# Patient Record
Sex: MALE | Race: WHITE | Employment: OTHER | URBAN - METROPOLITAN AREA
[De-identification: names, ages, dates, MRNs, and addresses within clinical notes are randomized per-mention and may not be internally consistent; named-entity substitution may affect disease eponyms.]

---

## 2021-01-01 ENCOUNTER — APPOINTMENT (OUTPATIENT)
Dept: CT IMAGING | Age: 86
DRG: 392 | End: 2021-01-01
Payer: MEDICARE

## 2021-01-01 ENCOUNTER — APPOINTMENT (OUTPATIENT)
Dept: CT IMAGING | Age: 86
DRG: 394 | End: 2021-01-01
Payer: MEDICARE

## 2021-01-01 ENCOUNTER — HOSPITAL ENCOUNTER (INPATIENT)
Age: 86
LOS: 1 days | DRG: 394 | End: 2021-03-22
Attending: EMERGENCY MEDICINE | Admitting: INTERNAL MEDICINE
Payer: MEDICARE

## 2021-01-01 ENCOUNTER — APPOINTMENT (OUTPATIENT)
Dept: GENERAL RADIOLOGY | Age: 86
DRG: 394 | End: 2021-01-01
Payer: MEDICARE

## 2021-01-01 ENCOUNTER — HOSPITAL ENCOUNTER (INPATIENT)
Age: 86
LOS: 4 days | Discharge: NURSING, REHAB OR LONG-TERM CARE | DRG: 392 | End: 2021-02-11
Attending: EMERGENCY MEDICINE | Admitting: INTERNAL MEDICINE
Payer: MEDICARE

## 2021-01-01 ENCOUNTER — APPOINTMENT (OUTPATIENT)
Dept: GENERAL RADIOLOGY | Age: 86
DRG: 392 | End: 2021-01-01
Payer: MEDICARE

## 2021-01-01 VITALS
DIASTOLIC BLOOD PRESSURE: 71 MMHG | HEIGHT: 69 IN | SYSTOLIC BLOOD PRESSURE: 115 MMHG | RESPIRATION RATE: 20 BRPM | WEIGHT: 200.4 LBS | HEART RATE: 98 BPM | OXYGEN SATURATION: 94 % | TEMPERATURE: 97 F | BODY MASS INDEX: 29.68 KG/M2

## 2021-01-01 VITALS
RESPIRATION RATE: 18 BRPM | DIASTOLIC BLOOD PRESSURE: 77 MMHG | TEMPERATURE: 96.3 F | SYSTOLIC BLOOD PRESSURE: 152 MMHG | WEIGHT: 214 LBS | HEIGHT: 69 IN | BODY MASS INDEX: 31.7 KG/M2 | HEART RATE: 90 BPM | OXYGEN SATURATION: 96 %

## 2021-01-01 DIAGNOSIS — N17.9 SEPSIS WITH ACUTE RENAL FAILURE WITHOUT SEPTIC SHOCK, DUE TO UNSPECIFIED ORGANISM, UNSPECIFIED ACUTE RENAL FAILURE TYPE (HCC): ICD-10-CM

## 2021-01-01 DIAGNOSIS — K56.609 INTESTINAL OBSTRUCTION, UNSPECIFIED CAUSE, UNSPECIFIED WHETHER PARTIAL OR COMPLETE (HCC): ICD-10-CM

## 2021-01-01 DIAGNOSIS — K57.32 DIVERTICULITIS OF COLON: Primary | ICD-10-CM

## 2021-01-01 DIAGNOSIS — N17.9 AKI (ACUTE KIDNEY INJURY) (HCC): ICD-10-CM

## 2021-01-01 DIAGNOSIS — K57.20 DIVERTICULITIS OF LARGE INTESTINE WITH ABSCESS, UNSPECIFIED BLEEDING STATUS: ICD-10-CM

## 2021-01-01 DIAGNOSIS — A41.9 SEPSIS WITH ACUTE RENAL FAILURE WITHOUT SEPTIC SHOCK, DUE TO UNSPECIFIED ORGANISM, UNSPECIFIED ACUTE RENAL FAILURE TYPE (HCC): ICD-10-CM

## 2021-01-01 DIAGNOSIS — R65.20 SEVERE SEPSIS (HCC): Primary | ICD-10-CM

## 2021-01-01 DIAGNOSIS — R65.20 SEPSIS WITH ACUTE RENAL FAILURE WITHOUT SEPTIC SHOCK, DUE TO UNSPECIFIED ORGANISM, UNSPECIFIED ACUTE RENAL FAILURE TYPE (HCC): ICD-10-CM

## 2021-01-01 DIAGNOSIS — A41.9 SEVERE SEPSIS (HCC): Primary | ICD-10-CM

## 2021-01-01 LAB
A/G RATIO: 0.7 (ref 1.1–2.2)
A/G RATIO: 0.7 (ref 1.1–2.2)
A/G RATIO: 0.8 (ref 1.1–2.2)
ALBUMIN SERPL-MCNC: 2.6 G/DL (ref 3.4–5)
ALBUMIN SERPL-MCNC: 2.9 G/DL (ref 3.4–5)
ALBUMIN SERPL-MCNC: 3.3 G/DL (ref 3.4–5)
ALBUMIN SERPL-MCNC: 3.3 G/DL (ref 3.4–5)
ALP BLD-CCNC: 68 U/L (ref 40–129)
ALP BLD-CCNC: 70 U/L (ref 40–129)
ALP BLD-CCNC: 79 U/L (ref 40–129)
ALP BLD-CCNC: 81 U/L (ref 40–129)
ALT SERPL-CCNC: 11 U/L (ref 10–40)
ALT SERPL-CCNC: 12 U/L (ref 10–40)
ALT SERPL-CCNC: 8 U/L (ref 10–40)
ALT SERPL-CCNC: 9 U/L (ref 10–40)
AMORPHOUS: ABNORMAL /HPF
ANION GAP SERPL CALCULATED.3IONS-SCNC: 10 MMOL/L (ref 3–16)
ANION GAP SERPL CALCULATED.3IONS-SCNC: 12 MMOL/L (ref 3–16)
ANION GAP SERPL CALCULATED.3IONS-SCNC: 13 MMOL/L (ref 3–16)
ANION GAP SERPL CALCULATED.3IONS-SCNC: 6 MMOL/L (ref 3–16)
ANION GAP SERPL CALCULATED.3IONS-SCNC: 7 MMOL/L (ref 3–16)
ANION GAP SERPL CALCULATED.3IONS-SCNC: 8 MMOL/L (ref 3–16)
ANION GAP SERPL CALCULATED.3IONS-SCNC: 8 MMOL/L (ref 3–16)
ANISOCYTOSIS: ABNORMAL
ANISOCYTOSIS: ABNORMAL
AST SERPL-CCNC: 12 U/L (ref 15–37)
AST SERPL-CCNC: 13 U/L (ref 15–37)
AST SERPL-CCNC: 17 U/L (ref 15–37)
AST SERPL-CCNC: 18 U/L (ref 15–37)
ATYPICAL LYMPHOCYTE RELATIVE PERCENT: 8 % (ref 0–6)
BACTERIA: ABNORMAL /HPF
BACTERIA: ABNORMAL /HPF
BANDED NEUTROPHILS RELATIVE PERCENT: 15 % (ref 0–7)
BANDED NEUTROPHILS RELATIVE PERCENT: 23 % (ref 0–7)
BANDED NEUTROPHILS RELATIVE PERCENT: 3 % (ref 0–7)
BASOPHILS ABSOLUTE: 0 K/UL (ref 0–0.2)
BASOPHILS RELATIVE PERCENT: 0 %
BASOPHILS RELATIVE PERCENT: 0.1 %
BILIRUB SERPL-MCNC: 0.4 MG/DL (ref 0–1)
BILIRUB SERPL-MCNC: 0.4 MG/DL (ref 0–1)
BILIRUB SERPL-MCNC: 0.6 MG/DL (ref 0–1)
BILIRUB SERPL-MCNC: <0.2 MG/DL (ref 0–1)
BILIRUBIN DIRECT: <0.2 MG/DL (ref 0–0.3)
BILIRUBIN URINE: NEGATIVE
BILIRUBIN, INDIRECT: ABNORMAL MG/DL (ref 0–1)
BLOOD CULTURE, ROUTINE: NORMAL
BLOOD, URINE: ABNORMAL
BUN BLDV-MCNC: 17 MG/DL (ref 7–20)
BUN BLDV-MCNC: 24 MG/DL (ref 7–20)
BUN BLDV-MCNC: 41 MG/DL (ref 7–20)
BUN BLDV-MCNC: 41 MG/DL (ref 7–20)
BUN BLDV-MCNC: 61 MG/DL (ref 7–20)
BUN BLDV-MCNC: 63 MG/DL (ref 7–20)
BUN BLDV-MCNC: 69 MG/DL (ref 7–20)
C DIFF TOXIN/ANTIGEN: NORMAL
CALCIUM SERPL-MCNC: 10.5 MG/DL (ref 8.3–10.6)
CALCIUM SERPL-MCNC: 10.6 MG/DL (ref 8.3–10.6)
CALCIUM SERPL-MCNC: 9.3 MG/DL (ref 8.3–10.6)
CALCIUM SERPL-MCNC: 9.4 MG/DL (ref 8.3–10.6)
CALCIUM SERPL-MCNC: 9.4 MG/DL (ref 8.3–10.6)
CALCIUM SERPL-MCNC: 9.6 MG/DL (ref 8.3–10.6)
CALCIUM SERPL-MCNC: 9.8 MG/DL (ref 8.3–10.6)
CALPROTECTIN, FECAL: 524 UG/G
CHLORIDE BLD-SCNC: 102 MMOL/L (ref 99–110)
CHLORIDE BLD-SCNC: 108 MMOL/L (ref 99–110)
CHLORIDE BLD-SCNC: 109 MMOL/L (ref 99–110)
CHLORIDE BLD-SCNC: 109 MMOL/L (ref 99–110)
CHLORIDE BLD-SCNC: 93 MMOL/L (ref 99–110)
CHLORIDE BLD-SCNC: 97 MMOL/L (ref 99–110)
CHLORIDE BLD-SCNC: 99 MMOL/L (ref 99–110)
CLARITY: CLEAR
CO2: 23 MMOL/L (ref 21–32)
CO2: 24 MMOL/L (ref 21–32)
CO2: 25 MMOL/L (ref 21–32)
CO2: 26 MMOL/L (ref 21–32)
CO2: 27 MMOL/L (ref 21–32)
CO2: 27 MMOL/L (ref 21–32)
CO2: 30 MMOL/L (ref 21–32)
COLOR: YELLOW
CREAT SERPL-MCNC: 0.8 MG/DL (ref 0.8–1.3)
CREAT SERPL-MCNC: 0.9 MG/DL (ref 0.8–1.3)
CREAT SERPL-MCNC: 0.9 MG/DL (ref 0.8–1.3)
CREAT SERPL-MCNC: 1.4 MG/DL (ref 0.8–1.3)
CREAT SERPL-MCNC: 1.5 MG/DL (ref 0.8–1.3)
CREAT SERPL-MCNC: 1.7 MG/DL (ref 0.8–1.3)
CREAT SERPL-MCNC: 1.7 MG/DL (ref 0.8–1.3)
CRYPTOSPORIDIUM ANTIGEN STOOL: NORMAL
CRYSTALS, UA: ABNORMAL /HPF
CULTURE, BLOOD 2: NORMAL
E HISTOLYTICA ANTIGEN STOOL: NORMAL
EKG ATRIAL RATE: 108 BPM
EKG ATRIAL RATE: 114 BPM
EKG ATRIAL RATE: 95 BPM
EKG DIAGNOSIS: NORMAL
EKG P AXIS: 55 DEGREES
EKG P AXIS: 59 DEGREES
EKG P AXIS: 72 DEGREES
EKG P-R INTERVAL: 148 MS
EKG P-R INTERVAL: 150 MS
EKG P-R INTERVAL: 164 MS
EKG Q-T INTERVAL: 304 MS
EKG Q-T INTERVAL: 326 MS
EKG Q-T INTERVAL: 354 MS
EKG QRS DURATION: 106 MS
EKG QRS DURATION: 110 MS
EKG QRS DURATION: 98 MS
EKG QTC CALCULATION (BAZETT): 407 MS
EKG QTC CALCULATION (BAZETT): 444 MS
EKG QTC CALCULATION (BAZETT): 449 MS
EKG R AXIS: -62 DEGREES
EKG R AXIS: -62 DEGREES
EKG R AXIS: -67 DEGREES
EKG T AXIS: 82 DEGREES
EKG T AXIS: 85 DEGREES
EKG T AXIS: 87 DEGREES
EKG VENTRICULAR RATE: 108 BPM
EKG VENTRICULAR RATE: 114 BPM
EKG VENTRICULAR RATE: 95 BPM
EOSINOPHILS ABSOLUTE: 0 K/UL (ref 0–0.6)
EOSINOPHILS ABSOLUTE: 0.3 K/UL (ref 0–0.6)
EOSINOPHILS RELATIVE PERCENT: 0 %
EOSINOPHILS RELATIVE PERCENT: 3 %
ESTIMATED AVERAGE GLUCOSE: 114 MG/DL
GFR AFRICAN AMERICAN: 46
GFR AFRICAN AMERICAN: 46
GFR AFRICAN AMERICAN: 53
GFR AFRICAN AMERICAN: 57
GFR AFRICAN AMERICAN: >60
GFR NON-AFRICAN AMERICAN: 38
GFR NON-AFRICAN AMERICAN: 38
GFR NON-AFRICAN AMERICAN: 44
GFR NON-AFRICAN AMERICAN: 47
GFR NON-AFRICAN AMERICAN: >60
GI BACTERIAL PATHOGENS BY PCR: NORMAL
GIARDIA ANTIGEN STOOL: NORMAL
GLOBULIN: 4 G/DL
GLOBULIN: 4.3 G/DL
GLOBULIN: 4.7 G/DL
GLUCOSE BLD-MCNC: 101 MG/DL (ref 70–99)
GLUCOSE BLD-MCNC: 105 MG/DL (ref 70–99)
GLUCOSE BLD-MCNC: 109 MG/DL (ref 70–99)
GLUCOSE BLD-MCNC: 120 MG/DL (ref 70–99)
GLUCOSE BLD-MCNC: 124 MG/DL (ref 70–99)
GLUCOSE BLD-MCNC: 128 MG/DL (ref 70–99)
GLUCOSE BLD-MCNC: 139 MG/DL (ref 70–99)
GLUCOSE BLD-MCNC: 142 MG/DL (ref 70–99)
GLUCOSE BLD-MCNC: 143 MG/DL (ref 70–99)
GLUCOSE BLD-MCNC: 144 MG/DL (ref 70–99)
GLUCOSE BLD-MCNC: 145 MG/DL (ref 70–99)
GLUCOSE BLD-MCNC: 154 MG/DL (ref 70–99)
GLUCOSE BLD-MCNC: 166 MG/DL (ref 70–99)
GLUCOSE BLD-MCNC: 170 MG/DL (ref 70–99)
GLUCOSE BLD-MCNC: 189 MG/DL (ref 70–99)
GLUCOSE BLD-MCNC: 94 MG/DL (ref 70–99)
GLUCOSE BLD-MCNC: 95 MG/DL (ref 70–99)
GLUCOSE BLD-MCNC: 96 MG/DL (ref 70–99)
GLUCOSE BLD-MCNC: 99 MG/DL (ref 70–99)
GLUCOSE URINE: NEGATIVE MG/DL
HBA1C MFR BLD: 5.6 %
HCT VFR BLD CALC: 35.3 % (ref 40.5–52.5)
HCT VFR BLD CALC: 35.5 % (ref 40.5–52.5)
HCT VFR BLD CALC: 38.3 % (ref 40.5–52.5)
HCT VFR BLD CALC: 39 % (ref 40.5–52.5)
HEMATOLOGY PATH CONSULT: NO
HEMATOLOGY PATH CONSULT: NORMAL
HEMATOLOGY PATH CONSULT: YES
HEMOGLOBIN: 11.4 G/DL (ref 13.5–17.5)
HEMOGLOBIN: 11.5 G/DL (ref 13.5–17.5)
HEMOGLOBIN: 12.4 G/DL (ref 13.5–17.5)
HEMOGLOBIN: 13.1 G/DL (ref 13.5–17.5)
HYALINE CASTS: ABNORMAL /LPF (ref 0–2)
HYALINE CASTS: ABNORMAL /LPF (ref 0–2)
KETONES, URINE: 15 MG/DL
KETONES, URINE: ABNORMAL MG/DL
KETONES, URINE: NEGATIVE MG/DL
LACTIC ACID, SEPSIS: 3.5 MMOL/L (ref 0.4–1.9)
LACTIC ACID, SEPSIS: 4.4 MMOL/L (ref 0.4–1.9)
LACTIC ACID: 1.5 MMOL/L (ref 0.4–2)
LACTIC ACID: 2.4 MMOL/L (ref 0.4–2)
LACTIC ACID: 3 MMOL/L (ref 0.4–2)
LEUKOCYTE ESTERASE, URINE: ABNORMAL
LEUKOCYTE ESTERASE, URINE: NEGATIVE
LEUKOCYTE ESTERASE, URINE: NEGATIVE
LIPASE: 11 U/L (ref 13–60)
LIPASE: 8 U/L (ref 13–60)
LYMPHOCYTES ABSOLUTE: 0.6 K/UL (ref 1–5.1)
LYMPHOCYTES ABSOLUTE: 1 K/UL (ref 1–5.1)
LYMPHOCYTES ABSOLUTE: 2 K/UL (ref 1–5.1)
LYMPHOCYTES ABSOLUTE: 2.4 K/UL (ref 1–5.1)
LYMPHOCYTES RELATIVE PERCENT: 10 %
LYMPHOCYTES RELATIVE PERCENT: 25 %
LYMPHOCYTES RELATIVE PERCENT: 5.9 %
LYMPHOCYTES RELATIVE PERCENT: 9 %
MAGNESIUM: 2.8 MG/DL (ref 1.8–2.4)
MCH RBC QN AUTO: 28.4 PG (ref 26–34)
MCH RBC QN AUTO: 28.4 PG (ref 26–34)
MCH RBC QN AUTO: 28.5 PG (ref 26–34)
MCH RBC QN AUTO: 28.8 PG (ref 26–34)
MCHC RBC AUTO-ENTMCNC: 32 G/DL (ref 31–36)
MCHC RBC AUTO-ENTMCNC: 32.5 G/DL (ref 31–36)
MCHC RBC AUTO-ENTMCNC: 32.6 G/DL (ref 31–36)
MCHC RBC AUTO-ENTMCNC: 33.7 G/DL (ref 31–36)
MCV RBC AUTO: 85.6 FL (ref 80–100)
MCV RBC AUTO: 87.1 FL (ref 80–100)
MCV RBC AUTO: 87.6 FL (ref 80–100)
MCV RBC AUTO: 88.5 FL (ref 80–100)
MICROSCOPIC EXAMINATION: YES
MONOCYTES ABSOLUTE: 0.2 K/UL (ref 0–1.3)
MONOCYTES ABSOLUTE: 1.2 K/UL (ref 0–1.3)
MONOCYTES ABSOLUTE: 1.3 K/UL (ref 0–1.3)
MONOCYTES ABSOLUTE: 1.9 K/UL (ref 0–1.3)
MONOCYTES RELATIVE PERCENT: 11 %
MONOCYTES RELATIVE PERCENT: 13.3 %
MONOCYTES RELATIVE PERCENT: 17 %
MONOCYTES RELATIVE PERCENT: 2 %
MUCUS: ABNORMAL /LPF
MUCUS: ABNORMAL /LPF
NEUTROPHILS ABSOLUTE: 6.9 K/UL (ref 1.7–7.7)
NEUTROPHILS ABSOLUTE: 7.7 K/UL (ref 1.7–7.7)
NEUTROPHILS ABSOLUTE: 7.9 K/UL (ref 1.7–7.7)
NEUTROPHILS ABSOLUTE: 8.4 K/UL (ref 1.7–7.7)
NEUTROPHILS RELATIVE PERCENT: 50 %
NEUTROPHILS RELATIVE PERCENT: 59 %
NEUTROPHILS RELATIVE PERCENT: 65 %
NEUTROPHILS RELATIVE PERCENT: 80.7 %
NITRITE, URINE: NEGATIVE
OVALOCYTES: ABNORMAL
PDW BLD-RTO: 15.1 % (ref 12.4–15.4)
PDW BLD-RTO: 15.5 % (ref 12.4–15.4)
PDW BLD-RTO: 15.6 % (ref 12.4–15.4)
PDW BLD-RTO: 15.6 % (ref 12.4–15.4)
PERFORMED ON: ABNORMAL
PERFORMED ON: NORMAL
PH UA: 5 (ref 5–8)
PH UA: 5.5 (ref 5–8)
PH UA: 5.5 (ref 5–8)
PLATELET # BLD: 248 K/UL (ref 135–450)
PLATELET # BLD: 311 K/UL (ref 135–450)
PLATELET # BLD: 367 K/UL (ref 135–450)
PLATELET # BLD: 389 K/UL (ref 135–450)
PLATELET SLIDE REVIEW: ADEQUATE
PMV BLD AUTO: 7.2 FL (ref 5–10.5)
PMV BLD AUTO: 7.2 FL (ref 5–10.5)
PMV BLD AUTO: 7.4 FL (ref 5–10.5)
PMV BLD AUTO: 7.7 FL (ref 5–10.5)
POIKILOCYTES: ABNORMAL
POLYCHROMASIA: ABNORMAL
POLYCHROMASIA: ABNORMAL
POTASSIUM REFLEX MAGNESIUM: 4.5 MMOL/L (ref 3.5–5.1)
POTASSIUM REFLEX MAGNESIUM: 4.5 MMOL/L (ref 3.5–5.1)
POTASSIUM SERPL-SCNC: 3.3 MMOL/L (ref 3.5–5.1)
POTASSIUM SERPL-SCNC: 3.4 MMOL/L (ref 3.5–5.1)
POTASSIUM SERPL-SCNC: 3.5 MMOL/L (ref 3.5–5.1)
POTASSIUM SERPL-SCNC: 4.3 MMOL/L (ref 3.5–5.1)
POTASSIUM SERPL-SCNC: 4.3 MMOL/L (ref 3.5–5.1)
PROTEIN UA: ABNORMAL MG/DL
PROTEIN UA: NEGATIVE MG/DL
PROTEIN UA: NEGATIVE MG/DL
RBC # BLD: 4.01 M/UL (ref 4.2–5.9)
RBC # BLD: 4.06 M/UL (ref 4.2–5.9)
RBC # BLD: 4.37 M/UL (ref 4.2–5.9)
RBC # BLD: 4.56 M/UL (ref 4.2–5.9)
RBC UA: ABNORMAL /HPF (ref 0–4)
SARS-COV-2, NAAT: NOT DETECTED
SLIDE REVIEW: ABNORMAL
SODIUM BLD-SCNC: 133 MMOL/L (ref 136–145)
SODIUM BLD-SCNC: 134 MMOL/L (ref 136–145)
SODIUM BLD-SCNC: 134 MMOL/L (ref 136–145)
SODIUM BLD-SCNC: 136 MMOL/L (ref 136–145)
SODIUM BLD-SCNC: 141 MMOL/L (ref 136–145)
SODIUM BLD-SCNC: 142 MMOL/L (ref 136–145)
SODIUM BLD-SCNC: 143 MMOL/L (ref 136–145)
SPECIFIC GRAVITY UA: 1.01 (ref 1–1.03)
SPECIFIC GRAVITY UA: 1.02 (ref 1–1.03)
SPECIFIC GRAVITY UA: 1.02 (ref 1–1.03)
TOTAL PROTEIN: 6 G/DL (ref 6.4–8.2)
TOTAL PROTEIN: 6.9 G/DL (ref 6.4–8.2)
TOTAL PROTEIN: 7.6 G/DL (ref 6.4–8.2)
TOTAL PROTEIN: 8 G/DL (ref 6.4–8.2)
TROPONIN: 0.01 NG/ML
TROPONIN: 0.02 NG/ML
TROPONIN: 0.02 NG/ML
TROPONIN: <0.01 NG/ML
URINE REFLEX TO CULTURE: ABNORMAL
URINE REFLEX TO CULTURE: ABNORMAL
URINE TYPE: ABNORMAL
UROBILINOGEN, URINE: 0.2 E.U./DL
WBC # BLD: 11.3 K/UL (ref 4–11)
WBC # BLD: 11.3 K/UL (ref 4–11)
WBC # BLD: 9.4 K/UL (ref 4–11)
WBC # BLD: 9.8 K/UL (ref 4–11)
WBC UA: ABNORMAL /HPF (ref 0–5)

## 2021-01-01 PROCEDURE — 83690 ASSAY OF LIPASE: CPT

## 2021-01-01 PROCEDURE — 1200000000 HC SEMI PRIVATE

## 2021-01-01 PROCEDURE — 2580000003 HC RX 258: Performed by: INTERNAL MEDICINE

## 2021-01-01 PROCEDURE — 80048 BASIC METABOLIC PNL TOTAL CA: CPT

## 2021-01-01 PROCEDURE — 99232 SBSQ HOSP IP/OBS MODERATE 35: CPT | Performed by: INTERNAL MEDICINE

## 2021-01-01 PROCEDURE — 36415 COLL VENOUS BLD VENIPUNCTURE: CPT

## 2021-01-01 PROCEDURE — 2500000003 HC RX 250 WO HCPCS: Performed by: INTERNAL MEDICINE

## 2021-01-01 PROCEDURE — U0002 COVID-19 LAB TEST NON-CDC: HCPCS

## 2021-01-01 PROCEDURE — 80053 COMPREHEN METABOLIC PANEL: CPT

## 2021-01-01 PROCEDURE — 97530 THERAPEUTIC ACTIVITIES: CPT

## 2021-01-01 PROCEDURE — 96367 TX/PROPH/DG ADDL SEQ IV INF: CPT

## 2021-01-01 PROCEDURE — 6360000002 HC RX W HCPCS: Performed by: STUDENT IN AN ORGANIZED HEALTH CARE EDUCATION/TRAINING PROGRAM

## 2021-01-01 PROCEDURE — 6370000000 HC RX 637 (ALT 250 FOR IP): Performed by: INTERNAL MEDICINE

## 2021-01-01 PROCEDURE — 87505 NFCT AGENT DETECTION GI: CPT

## 2021-01-01 PROCEDURE — 99284 EMERGENCY DEPT VISIT MOD MDM: CPT

## 2021-01-01 PROCEDURE — 2580000003 HC RX 258: Performed by: EMERGENCY MEDICINE

## 2021-01-01 PROCEDURE — 93010 ELECTROCARDIOGRAM REPORT: CPT | Performed by: INTERNAL MEDICINE

## 2021-01-01 PROCEDURE — 6360000002 HC RX W HCPCS: Performed by: INTERNAL MEDICINE

## 2021-01-01 PROCEDURE — 83605 ASSAY OF LACTIC ACID: CPT

## 2021-01-01 PROCEDURE — 97161 PT EVAL LOW COMPLEX 20 MIN: CPT

## 2021-01-01 PROCEDURE — 84484 ASSAY OF TROPONIN QUANT: CPT

## 2021-01-01 PROCEDURE — 87328 CRYPTOSPORIDIUM AG IA: CPT

## 2021-01-01 PROCEDURE — 93005 ELECTROCARDIOGRAM TRACING: CPT | Performed by: PHYSICIAN ASSISTANT

## 2021-01-01 PROCEDURE — 87635 SARS-COV-2 COVID-19 AMP PRB: CPT

## 2021-01-01 PROCEDURE — 87336 ENTAMOEB HIST DISPR AG IA: CPT

## 2021-01-01 PROCEDURE — 81001 URINALYSIS AUTO W/SCOPE: CPT

## 2021-01-01 PROCEDURE — 85025 COMPLETE CBC W/AUTO DIFF WBC: CPT

## 2021-01-01 PROCEDURE — 2580000003 HC RX 258

## 2021-01-01 PROCEDURE — 2700000000 HC OXYGEN THERAPY PER DAY

## 2021-01-01 PROCEDURE — 93005 ELECTROCARDIOGRAM TRACING: CPT | Performed by: INTERNAL MEDICINE

## 2021-01-01 PROCEDURE — 96374 THER/PROPH/DIAG INJ IV PUSH: CPT

## 2021-01-01 PROCEDURE — 99223 1ST HOSP IP/OBS HIGH 75: CPT | Performed by: SURGERY

## 2021-01-01 PROCEDURE — 6360000002 HC RX W HCPCS: Performed by: PHYSICIAN ASSISTANT

## 2021-01-01 PROCEDURE — 94761 N-INVAS EAR/PLS OXIMETRY MLT: CPT

## 2021-01-01 PROCEDURE — 99223 1ST HOSP IP/OBS HIGH 75: CPT | Performed by: PHYSICIAN ASSISTANT

## 2021-01-01 PROCEDURE — 97110 THERAPEUTIC EXERCISES: CPT

## 2021-01-01 PROCEDURE — 97535 SELF CARE MNGMENT TRAINING: CPT

## 2021-01-01 PROCEDURE — 93005 ELECTROCARDIOGRAM TRACING: CPT | Performed by: EMERGENCY MEDICINE

## 2021-01-01 PROCEDURE — 97116 GAIT TRAINING THERAPY: CPT

## 2021-01-01 PROCEDURE — 6360000004 HC RX CONTRAST MEDICATION: Performed by: PHYSICIAN ASSISTANT

## 2021-01-01 PROCEDURE — 83036 HEMOGLOBIN GLYCOSYLATED A1C: CPT

## 2021-01-01 PROCEDURE — 80076 HEPATIC FUNCTION PANEL: CPT

## 2021-01-01 PROCEDURE — 96366 THER/PROPH/DIAG IV INF ADDON: CPT

## 2021-01-01 PROCEDURE — 97165 OT EVAL LOW COMPLEX 30 MIN: CPT

## 2021-01-01 PROCEDURE — 96375 TX/PRO/DX INJ NEW DRUG ADDON: CPT

## 2021-01-01 PROCEDURE — 87040 BLOOD CULTURE FOR BACTERIA: CPT

## 2021-01-01 PROCEDURE — 70450 CT HEAD/BRAIN W/O DYE: CPT

## 2021-01-01 PROCEDURE — 71045 X-RAY EXAM CHEST 1 VIEW: CPT

## 2021-01-01 PROCEDURE — 96365 THER/PROPH/DIAG IV INF INIT: CPT

## 2021-01-01 PROCEDURE — 83993 ASSAY FOR CALPROTECTIN FECAL: CPT

## 2021-01-01 PROCEDURE — 99239 HOSP IP/OBS DSCHRG MGMT >30: CPT | Performed by: INTERNAL MEDICINE

## 2021-01-01 PROCEDURE — 2500000003 HC RX 250 WO HCPCS: Performed by: PHYSICIAN ASSISTANT

## 2021-01-01 PROCEDURE — 74176 CT ABD & PELVIS W/O CONTRAST: CPT

## 2021-01-01 PROCEDURE — 6370000000 HC RX 637 (ALT 250 FOR IP): Performed by: PHYSICIAN ASSISTANT

## 2021-01-01 PROCEDURE — 6360000002 HC RX W HCPCS: Performed by: EMERGENCY MEDICINE

## 2021-01-01 PROCEDURE — 83735 ASSAY OF MAGNESIUM: CPT

## 2021-01-01 PROCEDURE — 99285 EMERGENCY DEPT VISIT HI MDM: CPT

## 2021-01-01 PROCEDURE — 2580000003 HC RX 258: Performed by: PHYSICIAN ASSISTANT

## 2021-01-01 PROCEDURE — 71260 CT THORAX DX C+: CPT

## 2021-01-01 RX ORDER — LOPERAMIDE HYDROCHLORIDE 2 MG/1
2 CAPSULE ORAL 4 TIMES DAILY PRN
Status: DISCONTINUED | OUTPATIENT
Start: 2021-01-01 | End: 2021-01-01 | Stop reason: HOSPADM

## 2021-01-01 RX ORDER — POTASSIUM CHLORIDE 750 MG/1
10 TABLET, EXTENDED RELEASE ORAL DAILY
COMMUNITY

## 2021-01-01 RX ORDER — 0.9 % SODIUM CHLORIDE 0.9 %
1000 INTRAVENOUS SOLUTION INTRAVENOUS ONCE
Status: COMPLETED | OUTPATIENT
Start: 2021-01-01 | End: 2021-01-01

## 2021-01-01 RX ORDER — ONDANSETRON 2 MG/ML
4 INJECTION INTRAMUSCULAR; INTRAVENOUS EVERY 8 HOURS PRN
Status: DISCONTINUED | OUTPATIENT
Start: 2021-01-01 | End: 2021-01-01 | Stop reason: HOSPADM

## 2021-01-01 RX ORDER — FLUTICASONE PROPIONATE 50 MCG
1 SPRAY, SUSPENSION (ML) NASAL DAILY
COMMUNITY

## 2021-01-01 RX ORDER — MORPHINE SULFATE 4 MG/ML
4 INJECTION, SOLUTION INTRAMUSCULAR; INTRAVENOUS ONCE
Status: COMPLETED | OUTPATIENT
Start: 2021-01-01 | End: 2021-01-01

## 2021-01-01 RX ORDER — AMOXICILLIN AND CLAVULANATE POTASSIUM 875; 125 MG/1; MG/1
1 TABLET, FILM COATED ORAL 3 TIMES DAILY
Qty: 21 TABLET | Refills: 0
Start: 2021-01-01 | End: 2021-01-01

## 2021-01-01 RX ORDER — SODIUM CHLORIDE 0.9 % (FLUSH) 0.9 %
10 SYRINGE (ML) INJECTION EVERY 12 HOURS SCHEDULED
Status: DISCONTINUED | OUTPATIENT
Start: 2021-01-01 | End: 2021-01-01 | Stop reason: HOSPADM

## 2021-01-01 RX ORDER — TIMOLOL MALEATE 5 MG/ML
1 SOLUTION/ DROPS OPHTHALMIC 2 TIMES DAILY
Status: DISCONTINUED | OUTPATIENT
Start: 2021-01-01 | End: 2021-01-01 | Stop reason: HOSPADM

## 2021-01-01 RX ORDER — M-VIT,TX,IRON,MINS/CALC/FOLIC 27MG-0.4MG
1 TABLET ORAL DAILY
COMMUNITY

## 2021-01-01 RX ORDER — MORPHINE SULFATE 20 MG/ML
5 SOLUTION ORAL
Status: DISCONTINUED | OUTPATIENT
Start: 2021-01-01 | End: 2021-01-01 | Stop reason: HOSPADM

## 2021-01-01 RX ORDER — ATROPINE SULFATE 10 MG/ML
2 SOLUTION/ DROPS OPHTHALMIC EVERY 4 HOURS PRN
Status: DISCONTINUED | OUTPATIENT
Start: 2021-01-01 | End: 2021-01-01 | Stop reason: HOSPADM

## 2021-01-01 RX ORDER — MORPHINE SULFATE 2 MG/ML
2 INJECTION, SOLUTION INTRAMUSCULAR; INTRAVENOUS
Status: DISCONTINUED | OUTPATIENT
Start: 2021-01-01 | End: 2021-01-01

## 2021-01-01 RX ORDER — ONDANSETRON 2 MG/ML
4 INJECTION INTRAMUSCULAR; INTRAVENOUS ONCE
Status: COMPLETED | OUTPATIENT
Start: 2021-01-01 | End: 2021-01-01

## 2021-01-01 RX ORDER — BRIMONIDINE TARTRATE 2 MG/ML
1 SOLUTION/ DROPS OPHTHALMIC 2 TIMES DAILY
Status: DISCONTINUED | OUTPATIENT
Start: 2021-01-01 | End: 2021-01-01

## 2021-01-01 RX ORDER — OXYCODONE HYDROCHLORIDE 5 MG/1
2.5 TABLET ORAL EVERY 6 HOURS PRN
Qty: 6 TABLET | Refills: 0 | Status: SHIPPED | OUTPATIENT
Start: 2021-01-01 | End: 2021-01-01

## 2021-01-01 RX ORDER — BRIMONIDINE TARTRATE, TIMOLOL MALEATE 2; 5 MG/ML; MG/ML
1 SOLUTION/ DROPS OPHTHALMIC 2 TIMES DAILY
Status: DISCONTINUED | OUTPATIENT
Start: 2021-01-01 | End: 2021-01-01 | Stop reason: CLARIF

## 2021-01-01 RX ORDER — SODIUM CHLORIDE 9 MG/ML
INJECTION, SOLUTION INTRAVENOUS CONTINUOUS
Status: DISCONTINUED | OUTPATIENT
Start: 2021-01-01 | End: 2021-01-01

## 2021-01-01 RX ORDER — ASCORBIC ACID 500 MG
500 TABLET ORAL 2 TIMES DAILY
COMMUNITY

## 2021-01-01 RX ORDER — LATANOPROST 50 UG/ML
1 SOLUTION/ DROPS OPHTHALMIC NIGHTLY
Status: DISCONTINUED | OUTPATIENT
Start: 2021-01-01 | End: 2021-01-01 | Stop reason: HOSPADM

## 2021-01-01 RX ORDER — RISPERIDONE 0.25 MG/1
0.25 TABLET, FILM COATED ORAL 2 TIMES DAILY PRN
Status: DISCONTINUED | OUTPATIENT
Start: 2021-01-01 | End: 2021-01-01 | Stop reason: HOSPADM

## 2021-01-01 RX ORDER — TRIAMCINOLONE ACETONIDE 55 UG/1
SPRAY, METERED NASAL
COMMUNITY

## 2021-01-01 RX ORDER — LATANOPROST 50 UG/ML
1 SOLUTION/ DROPS OPHTHALMIC
COMMUNITY
Start: 2020-01-01

## 2021-01-01 RX ORDER — ALBUTEROL SULFATE 2.5 MG/3ML
2.5 SOLUTION RESPIRATORY (INHALATION) EVERY 6 HOURS PRN
Status: DISCONTINUED | OUTPATIENT
Start: 2021-01-01 | End: 2021-01-01

## 2021-01-01 RX ORDER — ACETAMINOPHEN 650 MG/1
650 SUPPOSITORY RECTAL EVERY 6 HOURS PRN
Status: DISCONTINUED | OUTPATIENT
Start: 2021-01-01 | End: 2021-01-01

## 2021-01-01 RX ORDER — ONDANSETRON 2 MG/ML
4 INJECTION INTRAMUSCULAR; INTRAVENOUS EVERY 6 HOURS PRN
Status: DISCONTINUED | OUTPATIENT
Start: 2021-01-01 | End: 2021-01-01 | Stop reason: HOSPADM

## 2021-01-01 RX ORDER — POTASSIUM CHLORIDE 750 MG/1
10 TABLET, FILM COATED, EXTENDED RELEASE ORAL DAILY
COMMUNITY

## 2021-01-01 RX ORDER — HYDROCODONE BITARTRATE AND ACETAMINOPHEN 5; 325 MG/1; MG/1
1 TABLET ORAL EVERY 6 HOURS PRN
COMMUNITY

## 2021-01-01 RX ORDER — SODIUM CHLORIDE, SODIUM LACTATE, POTASSIUM CHLORIDE, AND CALCIUM CHLORIDE .6; .31; .03; .02 G/100ML; G/100ML; G/100ML; G/100ML
30 INJECTION, SOLUTION INTRAVENOUS ONCE
Status: COMPLETED | OUTPATIENT
Start: 2021-01-01 | End: 2021-01-01

## 2021-01-01 RX ORDER — LATANOPROST 50 UG/ML
1 SOLUTION/ DROPS OPHTHALMIC NIGHTLY
COMMUNITY
End: 2021-01-01

## 2021-01-01 RX ORDER — POLYETHYLENE GLYCOL 3350 17 G/17G
17 POWDER, FOR SOLUTION ORAL DAILY PRN
Status: DISCONTINUED | OUTPATIENT
Start: 2021-01-01 | End: 2021-01-01 | Stop reason: HOSPADM

## 2021-01-01 RX ORDER — PROMETHAZINE HYDROCHLORIDE 25 MG/1
12.5 TABLET ORAL EVERY 6 HOURS PRN
Status: DISCONTINUED | OUTPATIENT
Start: 2021-01-01 | End: 2021-01-01 | Stop reason: HOSPADM

## 2021-01-01 RX ORDER — TIMOLOL MALEATE 5 MG/ML
1 SOLUTION/ DROPS OPHTHALMIC 2 TIMES DAILY
Status: DISCONTINUED | OUTPATIENT
Start: 2021-01-01 | End: 2021-01-01

## 2021-01-01 RX ORDER — PANTOPRAZOLE SODIUM 40 MG/10ML
40 INJECTION, POWDER, LYOPHILIZED, FOR SOLUTION INTRAVENOUS DAILY
Status: DISCONTINUED | OUTPATIENT
Start: 2021-01-01 | End: 2021-01-01 | Stop reason: CLARIF

## 2021-01-01 RX ORDER — POTASSIUM CHLORIDE 20 MEQ/1
40 TABLET, EXTENDED RELEASE ORAL ONCE
Status: COMPLETED | OUTPATIENT
Start: 2021-01-01 | End: 2021-01-01

## 2021-01-01 RX ORDER — PROCHLORPERAZINE EDISYLATE 5 MG/ML
10 INJECTION INTRAMUSCULAR; INTRAVENOUS EVERY 6 HOURS PRN
Status: DISCONTINUED | OUTPATIENT
Start: 2021-01-01 | End: 2021-01-01

## 2021-01-01 RX ORDER — CALCIUM CARBONATE 500(1250)
500 TABLET ORAL DAILY
COMMUNITY

## 2021-01-01 RX ORDER — DICLOFENAC SODIUM 1 MG/ML
1 SOLUTION/ DROPS OPHTHALMIC 4 TIMES DAILY
COMMUNITY

## 2021-01-01 RX ORDER — ACETAMINOPHEN 325 MG/1
650 TABLET ORAL EVERY 6 HOURS PRN
Status: DISCONTINUED | OUTPATIENT
Start: 2021-01-01 | End: 2021-01-01

## 2021-01-01 RX ORDER — ACETAMINOPHEN 325 MG/1
650 TABLET ORAL EVERY 6 HOURS PRN
Status: DISCONTINUED | OUTPATIENT
Start: 2021-01-01 | End: 2021-01-01 | Stop reason: HOSPADM

## 2021-01-01 RX ORDER — UBIDECARENONE 75 MG
50 CAPSULE ORAL DAILY
COMMUNITY

## 2021-01-01 RX ORDER — MULTIVITAMIN WITH FOLIC ACID 400 MCG
1 TABLET ORAL DAILY
COMMUNITY

## 2021-01-01 RX ORDER — PROMETHAZINE HYDROCHLORIDE 12.5 MG/1
12.5 TABLET ORAL EVERY 6 HOURS PRN
Qty: 30 TABLET | Refills: 0
Start: 2021-01-01 | End: 2021-01-01

## 2021-01-01 RX ORDER — SODIUM CHLORIDE 0.9 % (FLUSH) 0.9 %
10 SYRINGE (ML) INJECTION PRN
Status: DISCONTINUED | OUTPATIENT
Start: 2021-01-01 | End: 2021-01-01 | Stop reason: HOSPADM

## 2021-01-01 RX ORDER — OXYCODONE HYDROCHLORIDE 5 MG/1
2.5 TABLET ORAL EVERY 6 HOURS PRN
Status: DISCONTINUED | OUTPATIENT
Start: 2021-01-01 | End: 2021-01-01 | Stop reason: HOSPADM

## 2021-01-01 RX ORDER — BRIMONIDINE TARTRATE 2 MG/ML
1 SOLUTION/ DROPS OPHTHALMIC 2 TIMES DAILY
Status: DISCONTINUED | OUTPATIENT
Start: 2021-01-01 | End: 2021-01-01 | Stop reason: HOSPADM

## 2021-01-01 RX ORDER — ESOMEPRAZOLE SODIUM 40 MG/5ML
40 INJECTION INTRAVENOUS DAILY
Status: DISCONTINUED | OUTPATIENT
Start: 2021-01-01 | End: 2021-01-01 | Stop reason: HOSPADM

## 2021-01-01 RX ORDER — FENTANYL CITRATE 50 UG/ML
50 INJECTION, SOLUTION INTRAMUSCULAR; INTRAVENOUS ONCE
Status: COMPLETED | OUTPATIENT
Start: 2021-01-01 | End: 2021-01-01

## 2021-01-01 RX ORDER — RISPERIDONE 0.25 MG/1
0.25 TABLET, FILM COATED ORAL 2 TIMES DAILY PRN
Qty: 60 TABLET | Refills: 0
Start: 2021-01-01 | End: 2021-01-01

## 2021-01-01 RX ORDER — FLUTICASONE PROPIONATE 50 MCG
1 SPRAY, SUSPENSION (ML) NASAL DAILY
Status: DISCONTINUED | OUTPATIENT
Start: 2021-01-01 | End: 2021-01-01

## 2021-01-01 RX ORDER — POTASSIUM CHLORIDE 20 MEQ/1
40 TABLET, EXTENDED RELEASE ORAL
Status: COMPLETED | OUTPATIENT
Start: 2021-01-01 | End: 2021-01-01

## 2021-01-01 RX ORDER — SODIUM CHLORIDE 9 MG/ML
INJECTION, SOLUTION INTRAVENOUS
Status: COMPLETED
Start: 2021-01-01 | End: 2021-01-01

## 2021-01-01 RX ORDER — FERROUS SULFATE 325(65) MG
325 TABLET ORAL
COMMUNITY

## 2021-01-01 RX ORDER — LORAZEPAM 2 MG/ML
1 CONCENTRATE ORAL
Status: DISCONTINUED | OUTPATIENT
Start: 2021-01-01 | End: 2021-01-01 | Stop reason: HOSPADM

## 2021-01-01 RX ORDER — HYDROCODONE BITARTRATE AND ACETAMINOPHEN 5; 325 MG/1; MG/1
1 TABLET ORAL EVERY 6 HOURS PRN
Status: ON HOLD | COMMUNITY
End: 2021-01-01 | Stop reason: HOSPADM

## 2021-01-01 RX ORDER — ACETAMINOPHEN 650 MG/1
650 SUPPOSITORY RECTAL EVERY 6 HOURS PRN
Status: DISCONTINUED | OUTPATIENT
Start: 2021-01-01 | End: 2021-01-01 | Stop reason: HOSPADM

## 2021-01-01 RX ADMIN — APIXABAN 5 MG: 5 TABLET, FILM COATED ORAL at 08:48

## 2021-01-01 RX ADMIN — BRIMONIDINE TARTRATE 1 DROP: 2 SOLUTION OPHTHALMIC at 21:23

## 2021-01-01 RX ADMIN — BRIMONIDINE TARTRATE 1 DROP: 2 SOLUTION OPHTHALMIC at 21:34

## 2021-01-01 RX ADMIN — MORPHINE SULFATE 2 MG: 2 INJECTION, SOLUTION INTRAMUSCULAR; INTRAVENOUS at 08:32

## 2021-01-01 RX ADMIN — HYDROMORPHONE HYDROCHLORIDE 0.5 MG: 1 INJECTION, SOLUTION INTRAMUSCULAR; INTRAVENOUS; SUBCUTANEOUS at 22:08

## 2021-01-01 RX ADMIN — RISPERIDONE 0.25 MG: 0.25 TABLET ORAL at 09:43

## 2021-01-01 RX ADMIN — TIMOLOL MALEATE 1 DROP: 5 SOLUTION/ DROPS OPHTHALMIC at 10:00

## 2021-01-01 RX ADMIN — Medication 10 ML: at 21:33

## 2021-01-01 RX ADMIN — LATANOPROST 1 DROP: 50 SOLUTION OPHTHALMIC at 21:22

## 2021-01-01 RX ADMIN — SODIUM CHLORIDE 1000 ML: 9 INJECTION, SOLUTION INTRAVENOUS at 14:35

## 2021-01-01 RX ADMIN — TIMOLOL MALEATE 1 DROP: 5 SOLUTION/ DROPS OPHTHALMIC at 20:51

## 2021-01-01 RX ADMIN — Medication 10 ML: at 08:50

## 2021-01-01 RX ADMIN — APIXABAN 5 MG: 5 TABLET, FILM COATED ORAL at 22:08

## 2021-01-01 RX ADMIN — TIMOLOL MALEATE 1 DROP: 5 SOLUTION/ DROPS OPHTHALMIC at 21:33

## 2021-01-01 RX ADMIN — BRIMONIDINE TARTRATE 1 DROP: 2 SOLUTION OPHTHALMIC at 08:50

## 2021-01-01 RX ADMIN — PIPERACILLIN SODIUM AND TAZOBACTAM SODIUM 3375 MG: 3; .375 INJECTION, POWDER, LYOPHILIZED, FOR SOLUTION INTRAVENOUS at 08:48

## 2021-01-01 RX ADMIN — MORPHINE SULFATE 4 MG: 4 INJECTION, SOLUTION INTRAMUSCULAR; INTRAVENOUS at 14:35

## 2021-01-01 RX ADMIN — LATANOPROST 1 DROP: 50 SOLUTION OPHTHALMIC at 22:21

## 2021-01-01 RX ADMIN — ESOMEPRAZOLE SODIUM 40 MG: 40 INJECTION, POWDER, LYOPHILIZED, FOR SOLUTION INTRAVENOUS at 09:43

## 2021-01-01 RX ADMIN — FENTANYL CITRATE 50 MCG: 50 INJECTION, SOLUTION INTRAMUSCULAR; INTRAVENOUS at 03:32

## 2021-01-01 RX ADMIN — SODIUM CHLORIDE: 9 INJECTION, SOLUTION INTRAVENOUS at 21:22

## 2021-01-01 RX ADMIN — ONDANSETRON HYDROCHLORIDE 4 MG: 2 INJECTION, SOLUTION INTRAMUSCULAR; INTRAVENOUS at 12:15

## 2021-01-01 RX ADMIN — Medication 10 ML: at 08:10

## 2021-01-01 RX ADMIN — PIPERACILLIN SODIUM AND TAZOBACTAM SODIUM 3375 MG: 3; .375 INJECTION, POWDER, LYOPHILIZED, FOR SOLUTION INTRAVENOUS at 23:53

## 2021-01-01 RX ADMIN — Medication 10 ML: at 10:00

## 2021-01-01 RX ADMIN — PIPERACILLIN SODIUM AND TAZOBACTAM SODIUM 3375 MG: 3; .375 INJECTION, POWDER, LYOPHILIZED, FOR SOLUTION INTRAVENOUS at 02:20

## 2021-01-01 RX ADMIN — TIMOLOL MALEATE 1 DROP: 5 SOLUTION/ DROPS OPHTHALMIC at 08:50

## 2021-01-01 RX ADMIN — TIMOLOL MALEATE 1 DROP: 5 SOLUTION/ DROPS OPHTHALMIC at 22:21

## 2021-01-01 RX ADMIN — Medication 5 MG: at 13:13

## 2021-01-01 RX ADMIN — PIPERACILLIN SODIUM AND TAZOBACTAM SODIUM 4500 MG: 4; .5 INJECTION, POWDER, LYOPHILIZED, FOR SOLUTION INTRAVENOUS at 23:04

## 2021-01-01 RX ADMIN — BRIMONIDINE TARTRATE 1 DROP: 2 SOLUTION OPHTHALMIC at 08:09

## 2021-01-01 RX ADMIN — LOPERAMIDE HYDROCHLORIDE 2 MG: 2 CAPSULE ORAL at 17:31

## 2021-01-01 RX ADMIN — BRIMONIDINE TARTRATE 1 DROP: 2 SOLUTION OPHTHALMIC at 22:22

## 2021-01-01 RX ADMIN — IOPAMIDOL 75 ML: 755 INJECTION, SOLUTION INTRAVENOUS at 12:50

## 2021-01-01 RX ADMIN — APIXABAN 5 MG: 5 TABLET, FILM COATED ORAL at 22:21

## 2021-01-01 RX ADMIN — POTASSIUM CHLORIDE 40 MEQ: 1500 TABLET, EXTENDED RELEASE ORAL at 12:08

## 2021-01-01 RX ADMIN — Medication 10 ML: at 20:58

## 2021-01-01 RX ADMIN — INSULIN LISPRO 1 UNITS: 100 INJECTION, SOLUTION INTRAVENOUS; SUBCUTANEOUS at 17:12

## 2021-01-01 RX ADMIN — SODIUM CHLORIDE: 9 INJECTION, SOLUTION INTRAVENOUS at 05:24

## 2021-01-01 RX ADMIN — LATANOPROST 1 DROP: 50 SOLUTION OPHTHALMIC at 20:50

## 2021-01-01 RX ADMIN — OXYCODONE 2.5 MG: 5 TABLET ORAL at 12:16

## 2021-01-01 RX ADMIN — TIMOLOL MALEATE 1 DROP: 5 SOLUTION/ DROPS OPHTHALMIC at 08:09

## 2021-01-01 RX ADMIN — SODIUM CHLORIDE: 9 INJECTION, SOLUTION INTRAVENOUS at 05:06

## 2021-01-01 RX ADMIN — METRONIDAZOLE 500 MG: 500 INJECTION, SOLUTION INTRAVENOUS at 14:38

## 2021-01-01 RX ADMIN — APIXABAN 5 MG: 5 TABLET, FILM COATED ORAL at 08:09

## 2021-01-01 RX ADMIN — SODIUM CHLORIDE: 9 INJECTION, SOLUTION INTRAVENOUS at 16:02

## 2021-01-01 RX ADMIN — FLUTICASONE PROPIONATE 1 SPRAY: 50 SPRAY, METERED NASAL at 08:38

## 2021-01-01 RX ADMIN — ONDANSETRON HYDROCHLORIDE 4 MG: 2 INJECTION, SOLUTION INTRAMUSCULAR; INTRAVENOUS at 13:13

## 2021-01-01 RX ADMIN — PIPERACILLIN SODIUM AND TAZOBACTAM SODIUM: 3; .375 INJECTION, POWDER, LYOPHILIZED, FOR SOLUTION INTRAVENOUS at 08:48

## 2021-01-01 RX ADMIN — BRIMONIDINE TARTRATE 1 DROP: 2 SOLUTION OPHTHALMIC at 08:38

## 2021-01-01 RX ADMIN — PIPERACILLIN SODIUM AND TAZOBACTAM SODIUM 3375 MG: 3; .375 INJECTION, POWDER, LYOPHILIZED, FOR SOLUTION INTRAVENOUS at 16:26

## 2021-01-01 RX ADMIN — INSULIN LISPRO 1 UNITS: 100 INJECTION, SOLUTION INTRAVENOUS; SUBCUTANEOUS at 05:25

## 2021-01-01 RX ADMIN — PROMETHAZINE HYDROCHLORIDE 12.5 MG: 25 TABLET ORAL at 02:13

## 2021-01-01 RX ADMIN — PIPERACILLIN SODIUM AND TAZOBACTAM SODIUM 3375 MG: 3; .375 INJECTION, POWDER, LYOPHILIZED, FOR SOLUTION INTRAVENOUS at 00:28

## 2021-01-01 RX ADMIN — ESOMEPRAZOLE SODIUM 40 MG: 40 INJECTION, POWDER, LYOPHILIZED, FOR SOLUTION INTRAVENOUS at 09:56

## 2021-01-01 RX ADMIN — TIMOLOL MALEATE 1 DROP: 5 SOLUTION/ DROPS OPHTHALMIC at 21:22

## 2021-01-01 RX ADMIN — Medication 10 ML: at 22:21

## 2021-01-01 RX ADMIN — PIPERACILLIN SODIUM AND TAZOBACTAM SODIUM 3375 MG: 3; .375 INJECTION, POWDER, LYOPHILIZED, FOR SOLUTION INTRAVENOUS at 08:25

## 2021-01-01 RX ADMIN — POTASSIUM CHLORIDE 40 MEQ: 1500 TABLET, EXTENDED RELEASE ORAL at 11:09

## 2021-01-01 RX ADMIN — APIXABAN 5 MG: 5 TABLET, FILM COATED ORAL at 08:47

## 2021-01-01 RX ADMIN — BRIMONIDINE TARTRATE 1 DROP: 2 SOLUTION OPHTHALMIC at 10:00

## 2021-01-01 RX ADMIN — PIPERACILLIN SODIUM AND TAZOBACTAM SODIUM 3375 MG: 3; .375 INJECTION, POWDER, LYOPHILIZED, FOR SOLUTION INTRAVENOUS at 08:08

## 2021-01-01 RX ADMIN — POTASSIUM CHLORIDE 40 MEQ: 1500 TABLET, EXTENDED RELEASE ORAL at 08:47

## 2021-01-01 RX ADMIN — PROMETHAZINE HYDROCHLORIDE 12.5 MG: 25 TABLET ORAL at 02:35

## 2021-01-01 RX ADMIN — ENOXAPARIN SODIUM 100 MG: 100 INJECTION SUBCUTANEOUS at 21:22

## 2021-01-01 RX ADMIN — PIPERACILLIN SODIUM AND TAZOBACTAM SODIUM 3375 MG: 3; .375 INJECTION, POWDER, LYOPHILIZED, FOR SOLUTION INTRAVENOUS at 01:28

## 2021-01-01 RX ADMIN — SODIUM CHLORIDE: 9 INJECTION, SOLUTION INTRAVENOUS at 16:31

## 2021-01-01 RX ADMIN — PIPERACILLIN SODIUM AND TAZOBACTAM SODIUM 3375 MG: 3; .375 INJECTION, POWDER, LYOPHILIZED, FOR SOLUTION INTRAVENOUS at 17:05

## 2021-01-01 RX ADMIN — VANCOMYCIN HYDROCHLORIDE 2000 MG: 10 INJECTION, POWDER, LYOPHILIZED, FOR SOLUTION INTRAVENOUS at 23:47

## 2021-01-01 RX ADMIN — PIPERACILLIN SODIUM AND TAZOBACTAM SODIUM 3375 MG: 3; .375 INJECTION, POWDER, LYOPHILIZED, FOR SOLUTION INTRAVENOUS at 17:15

## 2021-01-01 RX ADMIN — ALBUTEROL SULFATE 2.5 MG: 2.5 SOLUTION RESPIRATORY (INHALATION) at 08:35

## 2021-01-01 RX ADMIN — SODIUM CHLORIDE, SODIUM LACTATE, POTASSIUM CHLORIDE, AND CALCIUM CHLORIDE 2913 ML: .6; .31; .03; .02 INJECTION, SOLUTION INTRAVENOUS at 22:53

## 2021-01-01 RX ADMIN — POTASSIUM CHLORIDE 40 MEQ: 1500 TABLET, EXTENDED RELEASE ORAL at 13:53

## 2021-01-01 RX ADMIN — BRIMONIDINE TARTRATE 1 DROP: 2 SOLUTION OPHTHALMIC at 20:53

## 2021-01-01 RX ADMIN — Medication 10 ML: at 18:42

## 2021-01-01 RX ADMIN — ONDANSETRON HYDROCHLORIDE 4 MG: 2 INJECTION, SOLUTION INTRAMUSCULAR; INTRAVENOUS at 21:44

## 2021-01-01 RX ADMIN — CEFTRIAXONE SODIUM 1000 MG: 1 INJECTION, POWDER, FOR SOLUTION INTRAMUSCULAR; INTRAVENOUS at 14:37

## 2021-01-01 RX ADMIN — ENOXAPARIN SODIUM 100 MG: 100 INJECTION SUBCUTANEOUS at 08:35

## 2021-01-01 RX ADMIN — ESOMEPRAZOLE SODIUM 40 MG: 40 INJECTION, POWDER, LYOPHILIZED, FOR SOLUTION INTRAVENOUS at 18:42

## 2021-01-01 RX ADMIN — PIPERACILLIN SODIUM AND TAZOBACTAM SODIUM 3375 MG: 3; .375 INJECTION, POWDER, LYOPHILIZED, FOR SOLUTION INTRAVENOUS at 09:56

## 2021-01-01 RX ADMIN — PIPERACILLIN SODIUM AND TAZOBACTAM SODIUM 3375 MG: 3; .375 INJECTION, POWDER, LYOPHILIZED, FOR SOLUTION INTRAVENOUS at 16:29

## 2021-01-01 RX ADMIN — SODIUM CHLORIDE: 9 INJECTION, SOLUTION INTRAVENOUS at 11:41

## 2021-01-01 RX ADMIN — APIXABAN 5 MG: 5 TABLET, FILM COATED ORAL at 21:33

## 2021-01-01 RX ADMIN — ESOMEPRAZOLE SODIUM 40 MG: 40 INJECTION, POWDER, LYOPHILIZED, FOR SOLUTION INTRAVENOUS at 12:09

## 2021-01-01 RX ADMIN — TIMOLOL MALEATE 1 DROP: 5 SOLUTION OPHTHALMIC at 08:38

## 2021-01-01 RX ADMIN — LATANOPROST 1 DROP: 50 SOLUTION OPHTHALMIC at 21:33

## 2021-01-01 RX ADMIN — APIXABAN 5 MG: 5 TABLET, FILM COATED ORAL at 09:59

## 2021-01-01 RX ADMIN — SODIUM CHLORIDE 1000 ML: 9 INJECTION, SOLUTION INTRAVENOUS at 12:38

## 2021-01-01 ASSESSMENT — PAIN SCALES - GENERAL
PAINLEVEL_OUTOF10: 10
PAINLEVEL_OUTOF10: 8
PAINLEVEL_OUTOF10: 7
PAINLEVEL_OUTOF10: 5
PAINLEVEL_OUTOF10: 7

## 2021-01-01 ASSESSMENT — ENCOUNTER SYMPTOMS
DIARRHEA: 1
NAUSEA: 1
EYE PAIN: 0
BACK PAIN: 0
SORE THROAT: 0
ABDOMINAL PAIN: 1
COUGH: 0
SORE THROAT: 0
SHORTNESS OF BREATH: 1
BACK PAIN: 0
ABDOMINAL PAIN: 1
VOMITING: 0
VOMITING: 1
EYE DISCHARGE: 0
COUGH: 0
NAUSEA: 0

## 2021-01-01 ASSESSMENT — PAIN SCALES - PAIN ASSESSMENT IN ADVANCED DEMENTIA (PAINAD)
CONSOLABILITY: 0
CONSOLABILITY: 0
NEGVOCALIZATION: 2
BODYLANGUAGE: 0
BREATHING: 0
FACIALEXPRESSION: 0
TOTALSCORE: 0
NEGVOCALIZATION: 0

## 2021-01-01 ASSESSMENT — PAIN DESCRIPTION - PROGRESSION
CLINICAL_PROGRESSION: GRADUALLY WORSENING
CLINICAL_PROGRESSION: GRADUALLY WORSENING

## 2021-01-01 ASSESSMENT — PAIN DESCRIPTION - LOCATION
LOCATION: ABDOMEN
LOCATION: CHEST
LOCATION: ABDOMEN

## 2021-01-01 ASSESSMENT — PAIN DESCRIPTION - PAIN TYPE
TYPE: ACUTE PAIN

## 2021-01-01 ASSESSMENT — PAIN - FUNCTIONAL ASSESSMENT: PAIN_FUNCTIONAL_ASSESSMENT: PREVENTS OR INTERFERES SOME ACTIVE ACTIVITIES AND ADLS

## 2021-01-01 ASSESSMENT — PAIN DESCRIPTION - FREQUENCY: FREQUENCY: CONTINUOUS

## 2021-01-01 ASSESSMENT — PAIN DESCRIPTION - ORIENTATION: ORIENTATION: LOWER

## 2021-01-01 ASSESSMENT — PAIN DESCRIPTION - ONSET: ONSET: GRADUAL

## 2021-02-07 PROBLEM — K52.9 COLITIS: Status: ACTIVE | Noted: 2021-01-01

## 2021-02-07 NOTE — ED NOTES
RN from floor called to get report, report given, pt to floor per stretcher and taken up by RN and tech.  Family member went up too     Kalli Arambula RN  02/07/21 [id number]

## 2021-02-07 NOTE — ED NOTES
Anave completed with orders being placed by Dr. Sreedhar Edwards at [address] [facility]  02/07/21 1179

## 2021-02-07 NOTE — PLAN OF CARE
Problem: Falls - Risk of:  Goal: Will remain free from falls  Description: Will remain free from falls  Outcome: Ongoing  Goal: Absence of physical injury  Description: Absence of physical injury  Outcome: Ongoing     Problem: Nutritional:  Goal: Nutritional status will improve  Description: Nutritional status will improve  Outcome: Ongoing     Problem: Physical Regulation:  Goal: Ability to maintain vital signs within normal range will improve  Description: Ability to maintain vital signs within normal range will improve  Outcome: Ongoing     Problem: Skin Integrity:  Goal: Demonstration of wound healing without infection will improve  Description: Demonstration of wound healing without infection will improve  Outcome: Ongoing  Goal: Complications related to intravenous access or infusion will be avoided or minimized  Description: Complications related to intravenous access or infusion will be avoided or minimized  Outcome: Ongoing

## 2021-02-07 NOTE — H&P
Hospital Medicine History & Physical      PCP: Fabiano Robison    Date of Admission: 2/7/2021    Date of Service: Pt seen/examined on 2/7/2021 and Admitted to Inpatient. Chief Complaint:  Nausea, poor PO intake, abd pain. History Of Present Illness: The patient is a 80 y.o. male w hx of DMII, Asthma arthritis, remote hx of MRSA infection, lives at home with his wife, who presents with abd pain. Pt reports his abdomen has been distended for years, but in the past several days he developed worsening pain - diffusely - worse across upper abdomen. This was associated with nausea and an episode of emesis and very poor PO intake - he does not feel like eating at all. His wife was worried and sent him to the ED. He denies diarrhea per say. He was unable to tell me when he had a BM last time. Pt has Hx of multiple DVTs - he used to be on coumadin. It appears that he is now taking eliquis instead. He reports colonoscopy many years ago (2007) - several polyps removed then. Past Medical History:        Diagnosis Date    Arthritis     Asthma     Glaucoma     Knee pain     MRSA (methicillin resistant staph aureus) culture positive 11/15/2012    abscess left forearm       Past Surgical History:        Procedure Laterality Date    CATARACT REMOVAL WITH IMPLANT  1/28/11    right eye    CATARACT REMOVAL WITH IMPLANT  2/24/11    LEFT    COLONOSCOPY      LITHOTRIPSY      TONSILLECTOMY         Medications Prior to Admission:    Prior to Admission medications    Medication Sig Start Date End Date Taking?  Authorizing Provider   apixaban (ELIQUIS) 5 MG TABS tablet Take 5 mg by mouth 2 times daily   Yes Historical Provider, MD ferrous sulfate (IRON 325) 325 (65 Fe) MG tablet Take 325 mg by mouth daily (with breakfast)   Yes Historical Provider, MD   latanoprost (XALATAN) 0.005 % ophthalmic solution Place 1 drop into both eyes nightly   Yes Historical Provider, MD   HYDROcodone-acetaminophen (NORCO) 5-325 MG per tablet Take 1 tablet by mouth every 6 hours as needed for Pain. Yes Historical Provider, MD   Brimonidine Tartrate-Timolol (COMBIGAN OP) Apply 1 drop to eye 2 times daily    Yes Historical Provider, MD   diphenhydrAMINE (BENADRYL) 25 MG tablet Take 25 mg by mouth as needed. 1/24/11  Yes Historical Provider, MD       Allergies:  Patient has no known allergies. Social History:  The patient currently lives at home with his wife. TOBACCO:   reports that he has never smoked. He has never used smokeless tobacco.  ETOH:   reports no history of alcohol use. Family History:  Reviewed in detail and negative for DM, Early CAD, Cancer, CVA. Positive as follows:    History reviewed. No pertinent family history. REVIEW OF SYSTEMS:   As noted in the HPI. All other systems reviewed and negative. PHYSICAL EXAM:    BP (!) 169/86   Pulse 101   Temp 96.9 °F (36.1 °C) (Oral)   Resp 18   Ht 5' 9\" (1.753 m)   Wt 214 lb (97.1 kg)   SpO2 95%   BMI 31.60 kg/m²     General appearance: No apparent distress appears stated age and cooperative. HEENT Normal cephalic, atraumatic without obvious deformity. Pupils equal, round, and reactive to light. Extra ocular muscles intact. Conjunctivae/corneas clear. Neck: Supple, No jugular venous distention/bruits. Trachea midline without thyromegaly or adenopathy with full range of motion. Lungs: Clear to auscultation, bilaterally without Rales/Wheezes/Rhonchi with good respiratory effort.   Heart: Regular rate and rhythm with Normal S1/S2 without murmurs, rubs or gallops, point of maximum impulse non-displaced Abdomen: distended, tense, no rigidity or rebound, tender throughout without focal tenderness. Extremities: No clubbing, cyanosis, trace edema bilaterally. Skin: Skin color, texture, turgor normal.  No rashes or lesions. Neurologic: Alert and oriented X 3, neurovascularly intact with sensory/motor intact upper extremities/lower extremities, bilaterally. Cranial nerves: II-XII intact, grossly non-focal.  Mental status: Alert, oriented, thought content appropriate. Capillary Refill: Acceptable  < 3 seconds  Peripheral Pulses: +3 Easily felt, not easily obliterated with pressure        CBC   Recent Labs     02/07/21  1146   WBC 9.8   HGB 13.1*   HCT 39.0*         RENAL  Recent Labs     02/07/21  1146   *   K 4.5   CL 93*   CO2 30   BUN 63*   CREATININE 1.5*     LFT'S  Recent Labs     02/07/21  1146   AST 17   ALT 12   BILITOT 0.6   ALKPHOS 81     COAG  No results for input(s): INR in the last 72 hours. CARDIAC ENZYMES  Recent Labs     02/07/21  1146   TROPONINI <0.01       U/A:    Lab Results   Component Value Date    COLORU Yellow 02/07/2021    WBCUA 0-2 02/07/2021    RBCUA 11-20 02/07/2021    MUCUS Rare 02/07/2021    BACTERIA 1+ 02/07/2021    CLARITYU Clear 02/07/2021    SPECGRAV 1.020 02/07/2021    LEUKOCYTESUR TRACE 02/07/2021    BLOODU TRACE-INTACT 02/07/2021    GLUCOSEU Negative 02/07/2021       ABG  No results found for: KLP4NJB, BEART, O2GEFWBF, PHART, THGBART, JDM0TNZ, PO2ART, YVT8HGU        Active Hospital Problems    Diagnosis Date Noted    Colitis [K52.9] 02/07/2021         PHYSICIANS CERTIFICATION:    I certify that Yvette Beltran is expected to be hospitalized for more than 2 midnights based on the following assessment and plan:      ASSESSMENT/PLAN:    Acute Diverticulitis and Diffuse Colitis with possibly underlying small bowel ileus. Plan:    - IV zosyn.    - GI consult.    - Liquid diet - bowel rest.       Hx of multiple DVTs -   Eliquis on hold. Will use full dose lovenox while off PO AC. DMII - PO regimen on hold. Cover with ISS q4       Glaucoma - cont PTA regimen       KERMIT -   By hx prerenal.   He also has hx of nephrolithiasis - monitor closely with IVF. Check urinalysis. DVT Prophylaxis: lovenox as above. Potonix IV GI Ppx. Diet: DIET CLEAR LIQUID;  Code Status: Full Code      Dispo - med surg. Dashawn Sullivan MD    Thank you Heri Wall for the opportunity to be involved in this patient's care. If you have any questions or concerns please feel free to contact me at [phone].

## 2021-02-07 NOTE — PROGRESS NOTES
4 Eyes Skin Assessment     The patient is being assess for   Admission    I agree that 2 RN's have performed a thorough Head to Toe Skin Assessment on the patient. ALL assessment sites listed below have been assessed. Areas assessed by both nurses:   [x]   Head, Face, and Ears   [x]   Shoulders, Back, and Chest, Abdomen  [x]   Arms, Elbows, and Hands   [x]   Coccyx, Sacrum, and Ischium  [x]   Legs, Feet, and Heels          **SHARE this note so that the co-signing nurse is able to place an eSignature**    Co-signer eSignature: Electronically signed by Hari Guzman RN on 2/7/21 at 3:40 PM EST    Does the Patient have Skin Breakdown?   No          Cristopher Prevention initiated:  no   Wound Care Orders initiated:  No      [facility] nurse consulted for Pressure Injury (Stage 3,4, Unstageable, DTI, NWPT, Complex wounds)and New or Established Ostomies:  Yes      Primary Nurse eSignature: Electronically signed by Kika Smalls RN on 2/7/21 at 3:38 PM EST

## 2021-02-07 NOTE — PROGRESS NOTES
Patient admitted from ER to room 227. Skin w/d and intact. Saline lock to right AC. Resp e/e unlabored. Abd distended with hypoactive bowel sounds. Edema noted to lower ext. Telemetry as ordered. Oriented to staff and room. Spouse at bedside. Call light in easy reach. Bed in low position. No s/s distress noted.

## 2021-02-08 PROBLEM — K57.32 DIVERTICULITIS OF COLON: Status: ACTIVE | Noted: 2021-01-01

## 2021-02-08 PROBLEM — N17.9 AKI (ACUTE KIDNEY INJURY) (HCC): Status: ACTIVE | Noted: 2021-01-01

## 2021-02-08 PROBLEM — I82.5Y9 CHRONIC DEEP VEIN THROMBOSIS (DVT) OF PROXIMAL VEIN OF LOWER EXTREMITY (HCC): Status: ACTIVE | Noted: 2021-01-01

## 2021-02-08 PROBLEM — R73.9 HYPERGLYCEMIA: Status: ACTIVE | Noted: 2021-01-01

## 2021-02-08 PROBLEM — R09.02 HYPOXIA: Status: ACTIVE | Noted: 2021-01-01

## 2021-02-08 NOTE — CONSULTS
 sodium chloride flush  10 mL Intravenous 2 times per day    enoxaparin  1 mg/kg Subcutaneous BID    brimonidine  1 drop Both Eyes BID    And    timolol  1 drop Both Eyes BID    esomeprazole  40 mg Intravenous Daily     Infusions:    sodium chloride 125 mL/hr at 21 0524     PRN Medications: sodium chloride flush, promethazine **OR** ondansetron, polyethylene glycol, acetaminophen **OR** acetaminophen  Allergies: No Known Allergies    Past Medical History:   Diagnosis Date    Arthritis     Asthma     Glaucoma     Knee pain     MRSA (methicillin resistant staph aureus) culture positive 11/15/2012    abscess left forearm     Past Surgical History:   Procedure Laterality Date    CATARACT REMOVAL WITH IMPLANT  11    right eye    CATARACT REMOVAL WITH IMPLANT  11    LEFT    COLONOSCOPY      LITHOTRIPSY      TONSILLECTOMY         Social:   Social History     Tobacco Use    Smoking status: Never Smoker    Smokeless tobacco: Never Used   Substance Use Topics    Alcohol use: No     Family: History reviewed. No pertinent family history. ROS: Pertinent items are noted in HPI.     Objective:   Vital Signs:  Temp (24hrs), Av.1 °F (36.2 °C), Min:96.5 °F (35.8 °C), Max:97.9 °F (18.3 °C)     Systolic (77YWB), VUC:401 , Min:138 , IRF:407      Diastolic (33XHE), YDO:98, Min:71, Max:86     Pulse  Av  Min: 97  Max: 112  BP (!) 158/78   Pulse 112   Temp 97.9 °F (36.6 °C) (Oral)   Resp 18   Ht 5' 9\" (1.753 m)   Wt 214 lb (97.1 kg)   SpO2 93%   BMI 31.60 kg/m²      Physical Exam:   /84   Pulse 102   Temp 98 °F (36.7 °C) (Oral)   Resp 18   Ht 5' 9\" (1.753 m)   Wt 214 lb (97.1 kg)   SpO2 93%   BMI 31.60 kg/m²   General appearance: alert, appears stated age and cooperative  Lungs: clear to auscultation bilaterally  Chest wall: no tenderness  Heart: regular rate and rhythm, S1, S2 normal, no murmur, click, rub or gallop Abdomen: abnormal findings:  distended and tenderness mild in the entire abdomen, but soft  Extremities: extremities normal, atraumatic, no cyanosis or edema  Skin: Skin color, texture, turgor normal. No rashes or lesions  Neurologic: Grossly normal    Lab and Imaging Review   Recent Labs     02/07/21  1146   WBC 9.8   HGB 13.1*   MCV 85.6      *   K 4.5   CL 93*   CO2 30   BUN 63*   CREATININE 1.5*   GLUCOSE 189*   CALCIUM 10.6   PROT 7.6   LABALBU 3.3*   AST 17   ALT 12   ALKPHOS 81   BILITOT 0.6       Assessment:     Patient Active Problem List    Diagnosis Date Noted    Colitis 02/07/2021     80 y.o. male w hx of DMII, Asthma, arthritis, remote hx of DVT's on Elquis, who presents with several days of diffuse abd pain, worse in upper abd, associated w N/V. He reports his abdomen has been distended for years though. CT revealed an uncomplicated acute sigmoid diverticulitis along w possible mild diffuse colitis and SI ileus w gallstones and constipation. Last colonoscopy is reportedly in 2007. Plan:   1. Supportive care  2. Agree w Abx's and daily Miralax  3. Continue clear liquid diet  4.  Will follow    Aarti Rojas MD       [phone]) [phone]

## 2021-02-08 NOTE — ED PROVIDER NOTES
[address] 50        Pt Name: Jayla Dupont  MRN: [id number]  [place] 6/8/1928  Date of evaluation: 2/7/2021  Provider: RADHIKA Beverly  PCP: Maria T Avila     I have seen and evaluated this patient with my supervising physician Dr. Pedro Mercado. CHIEF COMPLAINT       Chief Complaint   Patient presents with    Fatigue     wife reports pt has been weak, fatigued with loss of appetite and emesis x1 yesterday, concerned he has not taken much food or fluids for several days    Shortness of Breath    Abdominal Pain     off and on for a few days       HISTORY OF PRESENT ILLNESS   (Location, Timing/Onset, Context/Setting, Quality, Duration, Modifying Factors, Severity, Associated Signs and Symptoms)  Note limiting factors. Jayla Dupont is a 80 y.o. male presents the emergency department for abdominal pain, nausea. Patient reports that since Tuesday he has had decreased appetite, is only able to tolerate some liquids. Intermittent nonbloody, nonbilious vomiting. Pain is primarily in the left lower quadrant of his abdomen, sometimes feels pain in his right upper quadrant. Pain is dull, aching. Feels short of breath from the pain. Denies fever, chest pain, dysuria, urinary frequency, diarrhea, constipation. Denies alleviating or aggravating factors. Nursing Notes were all reviewed and agreed with or any disagreements were addressed in the HPI. REVIEW OF SYSTEMS    (2-9 systems for level 4, 10 or more for level 5)     Review of Systems   Constitutional: Negative for fever. HENT: Negative for sore throat. Eyes: Negative for pain and visual disturbance. Respiratory: Positive for shortness of breath. Negative for cough. Cardiovascular: Negative for chest pain. Gastrointestinal: Positive for abdominal pain, nausea and vomiting. Genitourinary: Negative for dysuria and frequency. Musculoskeletal: Negative for back pain and neck pain. Skin: Negative for rash. Neurological: Negative for weakness, numbness and headaches. Psychiatric/Behavioral: Negative for confusion. Positives and Pertinent negatives as per HPI. Except as noted above in the ROS, all other systems were reviewed and negative. PAST MEDICAL HISTORY     Past Medical History:   Diagnosis Date    Arthritis     Asthma     Glaucoma     Knee pain     MRSA (methicillin resistant staph aureus) culture positive 11/15/2012    abscess left forearm         SURGICAL HISTORY     Past Surgical History:   Procedure Laterality Date    CATARACT REMOVAL WITH IMPLANT  1/28/11    right eye    CATARACT REMOVAL WITH IMPLANT  2/24/11    LEFT    COLONOSCOPY      LITHOTRIPSY      TONSILLECTOMY           CURRENTMEDICATIONS       Current Discharge Medication List      CONTINUE these medications which have NOT CHANGED    Details   apixaban (ELIQUIS) 5 MG TABS tablet Take 5 mg by mouth 2 times daily      ferrous sulfate (IRON 325) 325 (65 Fe) MG tablet Take 325 mg by mouth daily (with breakfast)      latanoprost (XALATAN) 0.005 % ophthalmic solution Place 1 drop into both eyes nightly      HYDROcodone-acetaminophen (NORCO) 5-325 MG per tablet Take 1 tablet by mouth every 6 hours as needed for Pain. Brimonidine Tartrate-Timolol (COMBIGAN OP) Apply 1 drop to eye 2 times daily       diphenhydrAMINE (BENADRYL) 25 MG tablet Take 25 mg by mouth as needed. ALLERGIES     Patient has no known allergies. FAMILYHISTORY     History reviewed. No pertinent family history.        SOCIAL HISTORY       Social History     Tobacco Use    Smoking status: Never Smoker    Smokeless tobacco: Never Used   Substance Use Topics    Alcohol use: No    Drug use: Never       SCREENINGS    Tennyson Coma Scale  Eye Opening: Spontaneous  Best Verbal Response: Oriented  Best Motor Response: Obeys commands  Qamar Coma Scale Score: 15 PHYSICAL EXAM    (up to 7 for level 4, 8 or more for level 5)     ED Triage Vitals   BP Temp Temp Source Pulse Resp SpO2 Height Weight   02/07/21 1127 02/07/21 1127 02/07/21 1127 02/07/21 1127 02/07/21 1127 02/07/21 1127 02/07/21 1241 02/07/21 1241   (!) 170/84 96.5 °F (35.8 °C) Axillary 97 18 95 % 5' 9\" (1.753 m) 214 lb (97.1 kg)       Physical Exam  Vitals signs reviewed. Constitutional:       Appearance: He is not diaphoretic. HENT:      Nose: No congestion or rhinorrhea. Eyes:      General: No scleral icterus. Conjunctiva/sclera: Conjunctivae normal.   Neck:      Musculoskeletal: Normal range of motion and neck supple. Cardiovascular:      Rate and Rhythm: Normal rate and regular rhythm. Pulses: Normal pulses. Heart sounds: Normal heart sounds. No murmur. No friction rub. No gallop. Pulmonary:      Effort: Pulmonary effort is normal. No respiratory distress. Breath sounds: Normal breath sounds. No stridor. No wheezing, rhonchi or rales. Abdominal:      General: There is no distension. Palpations: Abdomen is soft. Tenderness: There is abdominal tenderness. There is no right CVA tenderness, left CVA tenderness, guarding or rebound. Comments: Left lower quadrant tenderness without rebound or guarding. No right upper quadrant tenderness appreciated. Negative White sign. Musculoskeletal: Normal range of motion. Skin:     General: Skin is warm and dry. Capillary Refill: Capillary refill takes less than 2 seconds. Neurological:      General: No focal deficit present. Mental Status: He is alert and oriented to person, place, and time. Sensory: No sensory deficit. Motor: No weakness.    Psychiatric:         Mood and Affect: Mood normal.         Behavior: Behavior normal.         DIAGNOSTIC RESULTS   LABS:    Labs Reviewed   CBC WITH AUTO DIFFERENTIAL - Abnormal; Notable for the following components:       Result Value    Hemoglobin 13.1 (*) Performed at:  [facility]) - [facility] [address],  [place]   Phone [phone]   POCT GLUCOSE - Abnormal; Notable for the following components:    POC Glucose 145 (*)     All other components within normal limits    Narrative:     Performed at:  [facility] [address],  [place], [facility]   Phone [phone]   CULTURE, BLOOD 1   CULTURE, BLOOD 2   TROPONIN    Narrative:     Performed at:  [facility] [address],  [place], [facility]   Phone [phone]    Narrative:     Performed at:  [facility]) - [facility] [address],  [place]   Phone [phone]   LACTIC ACID, PLASMA    Narrative:     Performed at:  [place] [address],  [place], [facility]   Phone [phone]   URINALYSIS WITH MICROSCOPIC   POCT GLUCOSE       All other labs were within normal range or not returned as of this dictation. EKG: All EKG's are interpreted by the Emergency Department Physician in the absence of a cardiologist.  Please see their note for interpretation of EKG. RADIOLOGY:   Non-plain film images such as CT, Ultrasound and MRI are read by the radiologist. Plain radiographic images are visualized and preliminarily interpreted by the ED Provider with the below findings:        Interpretation per the Radiologist below, if available at the time of this note:    CT CHEST [address]   Final Result   1. Mild acute sigmoid diverticulitis without gross perforation, abscess or   obstruction. 2.  Findings suggestive of diffuse colitis. 3.  Diffuse mild small bowel distension consistent with ileus. 4.  No acute airspace disease identified. 5.  Cholelithiasis without evidence for acute cholecystitis or biliary   dilatation. 6.  Bilateral nephrolithiasis. 7.  Suspect small bladder stone versus adjacent prostate calcification. XR CHEST PORTABLE   Preliminary Result   Mild bibasilar airspace disease, representing either atelectasis or pneumonia. Xr Chest Portable    Result Date: 2/7/2021  EXAMINATION: ONE XRAY VIEW OF THE CHEST 2/7/2021 11:41 am COMPARISON: None. HISTORY: ORDERING SYSTEM PROVIDED HISTORY: Weakness TECHNOLOGIST PROVIDED HISTORY: Reason for exam:->Weakness Reason for Exam: weakness Acuity: Acute Type of Exam: Initial FINDINGS: A single frontal view of the chest was performed. There is no acute skeletal abnormality. The heart size and mediastinal contours are within normal limits. There are subtle curvilinear and hazy opacities within the bilateral lung bases, which could represent either atelectasis or pneumonia. The mid and upper lung zones are clear. There is no evidence a pneumothorax. Mild bibasilar airspace disease, representing either atelectasis or pneumonia.      Ct Chest Abdomen Pelvis W Contrast    Result Date: 2/7/2021 EXAMINATION: CT OF THE CHEST, ABDOMEN, AND PELVIS WITH CONTRAST 2/7/2021 12:47 pm TECHNIQUE: CT of the chest, abdomen and pelvis was performed with the administration of intravenous contrast. Multiplanar reformatted images are provided for review. Dose modulation, iterative reconstruction, and/or weight based adjustment of the mA/kV was utilized to reduce the radiation dose to as low as reasonably achievable. COMPARISON: Chest radiograph today HISTORY: ORDERING SYSTEM PROVIDED HISTORY: Abdominal pain, nausea, vomiting TECHNOLOGIST PROVIDED HISTORY: Reason for exam:->Abdominal pain, nausea, vomiting Additional Contrast?->None Decision Support Exception->Emergency Medical Condition (MA) Reason for Exam: FATIGUE, ABD PAIN FINDINGS: Chest: Mediastinum: The heart and great vessels reveal no acute findings. No pericardial effusion. No enlarged or suspicious-appearing lymph nodes are identified. Diffuse esophageal wall thickening without significant distension. Lungs/pleura: Respiratory motion artifact. No consolidation, evidence for edema or effusion. Linear opacities in the lung bases likely represent subsegmental atelectasis or scarring. The central airway is patent. Soft Tissues/Bones: No acute findings. Mild bilateral gynecomastia, right greater than left. Abdomen/Pelvis: Organs: Distended gallbladder with small stone present. No wall thickening or inflammatory change. No evidence for biliary dilatation or choledocholithiasis. The liver, pancreas, spleen, adrenals and kidneys reveal no acute findings. Bilateral parapelvic and intraparenchymal renal cysts are noted. Bilateral nephrolithiasis. No stone identified in either ureter. GI/Bowel: Sigmoid diverticulitis is present without gross perforation, abscess or obstruction. Mild diffuse small bowel distension with fluid without transition point. Moderate semi solid stool burden in the proximal colon with mild gaseous distension.   Diffuse nodular colonic wall thickening is also present, suggestive of colitis. Pelvis: Prostate enlargement, impressing upon the bladder. Suspect posterior bladder stone near the prostate measuring 13 mm. Peritoneum/Retroperitoneum: No free air or free fluid. The aorta is normal in caliber. The visceral branches are patent. Calcified atheromatous plaque is present. No lymphadenopathy. Bones/Soft Tissues: No acute osseous or acute soft tissue abnormality identified. Multilevel degenerative change in the spine with lower lumbar facet arthropathy. 1.  Mild acute sigmoid diverticulitis without gross perforation, abscess or obstruction. 2.  Findings suggestive of diffuse colitis. 3.  Diffuse mild small bowel distension consistent with ileus. 4.  No acute airspace disease identified. 5.  Cholelithiasis without evidence for acute cholecystitis or biliary dilatation. 6.  Bilateral nephrolithiasis. 7.  Suspect small bladder stone versus adjacent prostate calcification. PROCEDURES   Unless otherwise noted below, none     Procedures    CRITICAL CARE TIME   Due to the immediate potential for life-threatening deterioration due to sepsis with endorgan damage of KERMIT, I spent 45 minutes providing critical care. Rationale for critical care is high risk for hemodynamic compromise from sepsis. Critical intervention was initiating IV fluids, antibiotics. This time is excluding time spent performing procedures.     CONSULTS:  IP CONSULT TO HOSPITALIST  IP CONSULT TO GI      EMERGENCY DEPARTMENT COURSE and DIFFERENTIAL DIAGNOSIS/MDM:   Vitals:    Vitals:    02/07/21 1236 02/07/21 1237 02/07/21 1241 02/07/21 1517   BP:  (!) 151/73  (!) 169/86   Pulse: 106 102  101   Resp: 17 15  18   Temp:    96.9 °F (36.1 °C)   TempSrc:    Oral   SpO2: 95%   95%   Weight:   214 lb (97.1 kg) 214 lb (97.1 kg)   Height:   5' 9\" (1.753 m) 5' 9\" (1.753 m)       Patient was given the following medications:  Medications latanoprost (XALATAN) 0.005 % ophthalmic solution 1 drop (has no administration in time range)   piperacillin-tazobactam (ZOSYN) 3,375 mg in sodium chloride 0.9 % 100 mL IVPB extended infusion (mini-bag) (3,375 mg Intravenous New Bag 2/7/21 1715)   0.9 % sodium chloride infusion ( Intravenous New Bag 2/7/21 1602)   insulin lispro (HUMALOG) injection vial 0-6 Units (1 Units Subcutaneous Given 2/7/21 1712)   sodium chloride flush 0.9 % injection 10 mL (has no administration in time range)   sodium chloride flush 0.9 % injection 10 mL (10 mLs Intravenous Given 2/7/21 1842)   enoxaparin (LOVENOX) injection 100 mg (has no administration in time range)   promethazine (PHENERGAN) tablet 12.5 mg (has no administration in time range)     Or   ondansetron (ZOFRAN) injection 4 mg (has no administration in time range)   polyethylene glycol (GLYCOLAX) packet 17 g (has no administration in time range)   acetaminophen (TYLENOL) tablet 650 mg (has no administration in time range)     Or   acetaminophen (TYLENOL) suppository 650 mg (has no administration in time range)   brimonidine (ALPHAGAN) 0.2 % ophthalmic solution 1 drop (has no administration in time range)     And   timolol (TIMOPTIC) 0.5 % ophthalmic solution 1 drop (has no administration in time range)   esomeprazole (NEXIUM) injection 40 mg (40 mg Intravenous Given 2/7/21 1842)   ondansetron (ZOFRAN) injection 4 mg (4 mg Intravenous Given 2/7/21 1215)   0.9 % sodium chloride bolus (0 mLs Intravenous Stopped 2/7/21 1408)   iopamidol (ISOVUE-370) 76 % injection 75 mL (75 mLs Intravenous Given 2/7/21 1250)   0.9 % sodium chloride bolus (0 mLs Intravenous Stopped 2/7/21 1602)   cefTRIAXone (ROCEPHIN) 1000 mg IVPB in 50 mL D5W minibag (0 mg Intravenous Stopped 2/7/21 1601)   morphine sulfate (PF) injection 4 mg (4 mg Intravenous Given 2/7/21 1435) 54-year-old male presents the emergency department for abdominal pain, nausea or vomiting. He has shortness of breath secondary to his abdominal pain. Hypoxia with tachypnea or adverse breath sounds on examination. Does have abdominal tenderness. Due to his age, I did elect to order CT abdomen pelvis with IV contrast for further assessment, shows diverticulitis without perforation or abscess. There is also evidence of cholelithiasis but no evidence of cholecystitis or biliary stricture. Labs do not indicate biliary obstruction. Labs notable for elevated lactate and KERMIT, concerning for sepsis without septic shock. IV fluids, IV antibiotic with Rocephin and metronidazole initiated. Order placed for consultation with hospitalist, they have placed admission orders for the patient. FINAL IMPRESSION      1. Diverticulitis of colon    2. Sepsis with acute renal failure without septic shock, due to unspecified organism, unspecified acute renal failure type ([facility] [address]    3. KERMIT (acute kidney injury) [facility])          DISPOSITION/PLAN   DISPOSITION Admitted 02/07/2021 01:57:46 PM      PATIENT REFERREDTO:  No follow-up provider specified.     DISCHARGE MEDICATIONS:  Current Discharge Medication List          DISCONTINUED MEDICATIONS:  Current Discharge Medication List      STOP taking these medications       tamsulosin (FLOMAX) 0.4 MG capsule Comments:   Reason for Stopping:         fluticasone (FLONASE) 50 MCG/ACT nasal spray Comments:   Reason for Stopping:         predniSONE (DELTASONE) 10 MG tablet Comments:   Reason for Stopping:         benzonatate (TESSALON) 100 MG capsule Comments:   Reason for Stopping:         Albuterol Sulfate (PROAIR HFA IN) Comments:   Reason for Stopping:         warfarin (COUMADIN) 2 MG tablet Comments:   Reason for Stopping: (Please note that portions of this note were completed with a voice recognition program.  Efforts were made to edit the dictations but occasionally words are mis-transcribed.)    Remus Jeans, PA (electronically signed)         Remus Jeans, PA  02/07/21 1918

## 2021-02-08 NOTE — FLOWSHEET NOTE
02/08/21 0929   Vital Signs   Pulse 102   /84   Per telemetry pt ran 6 beats vtach. Pt asymptomatic, denies lightheadedness or dizziness. Dr. Ramirez Lewis notified, new order for add on magnesium. Lab ordered. Will continue to monitor.

## 2021-02-08 NOTE — FLOWSHEET NOTE
02/07/21 2111   Vital Signs   Temp 97 °F (36.1 °C)   Temp Source Oral   Pulse 110   Heart Rate Source Monitor   Resp 18   /71   BP Location Left upper arm   Patient Position Semi fowlers   Level of Consciousness Alert (0)   MEWS Score 2   Oxygen Therapy   SpO2 94 %   O2 Device None (Room air)   Pt A/O x 4 assessment completed, Pt denies any pain, N/V. meds given per MAR. Urine sample collected and sent to lab. Pt denies any needs. Call light within reach, bed alarm pad in place.

## 2021-02-08 NOTE — PLAN OF CARE
Problem: Falls - Risk of:  Goal: Will remain free from falls  Description: Will remain free from falls  2/8/2021 1058 by Yoly Mohamud RN  Outcome: Ongoing  2/7/2021 2253 by Ayana Conteh RN  Outcome: Ongoing  Goal: Absence of physical injury  Description: Absence of physical injury  Outcome: Ongoing     Problem: Nutritional:  Goal: Nutritional status will improve  Description: Nutritional status will improve  2/8/2021 1058 by Yoly Mohamud RN  Outcome: Ongoing  2/7/2021 2253 by Ayana Conteh RN  Outcome: Ongoing  2/7/2021 2253 by Ayana Conteh RN  Outcome: Ongoing     Problem: Physical Regulation:  Goal: Ability to maintain vital signs within normal range will improve  Description: Ability to maintain vital signs within normal range will improve  Outcome: Ongoing     Problem: Skin Integrity:  Goal: Demonstration of wound healing without infection will improve  Description: Demonstration of wound healing without infection will improve  2/8/2021 1058 by Yoly Mohamud RN  Outcome: Ongoing  2/7/2021 2253 by Ayana Conteh RN  Outcome: Ongoing  2/7/2021 2253 by Ayana Conteh RN  Outcome: Ongoing  Goal: Complications related to intravenous access or infusion will be avoided or minimized  Description: Complications related to intravenous access or infusion will be avoided or minimized  Outcome: Ongoing  Goal: Will show no infection signs and symptoms  Description: Will show no infection signs and symptoms  Outcome: Ongoing  Goal: Absence of new skin breakdown  Description: Absence of new skin breakdown  Outcome: Ongoing     Problem: Safety:  Goal: Free from accidental physical injury  Description: Free from accidental physical injury  Outcome: Ongoing  Goal: Free from intentional harm  Description: Free from intentional harm  Outcome: Ongoing     Problem: Infection:  Goal: Will remain free from infection  Description: Will remain free from infection  Outcome: Ongoing     Problem: Discharge Planning: Goal: Patients continuum of care needs are met  Description: Patients continuum of care needs are met  2/8/2021 1058 by Renan Burton RN  Outcome: Ongoing  2/7/2021 2253 by Iveth Mathias RN  Outcome: Ongoing

## 2021-02-08 NOTE — PROGRESS NOTES
Pt confused at this time. Not sure where he is and how he got here. Reoriented pt at this time. Pt incont of bowel and bladder changed and repositioned. Pt provided with fresh ice water. Will continue to monitor.

## 2021-02-08 NOTE — PROGRESS NOTES
Shift assessment complete. VSS. Pt alert and oriented x4, no confusion at this time. Medications given as ordered. Abdomen soft, rounded but distended, active bowel sounds x4. Pt cleaned for BM this am, diarrhea, dark in color per PCA. Bottom excoriated and red, no open areas. Zinc cream applied to bottom. No needs at this time. Will continue to monitor.

## 2021-02-08 NOTE — PROGRESS NOTES
#Acute sigmoid diverticulitis and diffuse colitis seen on CT of the abdomen. He was started on IV Zosyn. Does not have much abdominal pain today. GI has been consulted. Denies diarrhea. Continue clear liquid diet. #Acute kidney injury. Creatinine 1.5 at admission. On IV fluids. Repeat labs pending. He did receive IV contrast at time of admission. 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 He is on full dose Lovenox at present. Will resume Eliquis as he is able to tolerate a clear liquid diet. #Hypoxia. Unclear etiology. We will watch him off oxygen. Normally not on home oxygen. #Hyperglycemia. No history of diabetes. Given advanced age we will stop doing blood sugar test 4 times a day. Check A1c. Comment: Please note this report has been produced using speech recognition software and may contain errors related to that system including errors in grammar, punctuation, and spelling, as well as words and phrases that may be inappropriate. If there are any questions or concerns please feel free to contact the dictating provider for clarification.      [facility] José Mitchell MD 2/8/2021 8:43 AM

## 2021-02-08 NOTE — FLOWSHEET NOTE
02/08/21 0515   Vital Signs   Temp 97.9 °F (36.6 °C)   Temp Source Oral   Pulse 112   Heart Rate Source Monitor   Resp 18   BP (!) 158/78   BP Location Left upper arm   Patient Position Semi fowlers   Level of Consciousness Alert (0)   MEWS Score 3   Oxygen Therapy   SpO2 93 %   O2 Device Nasal cannula   O2 Flow Rate (L/min) 3 L/min   Pt confused trying to get out of bed, pt incont of bowel and bladder. Pt reoriented to time, place and situation. Will continue to monitor. Call light within reach bed alarm pad on.

## 2021-02-08 NOTE — PLAN OF CARE
Problem: Falls - Risk of:  Goal: Will remain free from falls  Description: Will remain free from falls  2/7/2021 2253 by Zaheer Herrera RN  Outcome: Ongoing  2/7/2021 1544 by Elmer Wallace RN  Outcome: Ongoing  Goal: Absence of physical injury  Description: Absence of physical injury  2/7/2021 1544 by Elmer Wallace RN  Outcome: Ongoing     Problem: Nutritional:  Goal: Nutritional status will improve  Description: Nutritional status will improve  2/7/2021 2253 by Zaheer Herrera RN  Outcome: Ongoing  2/7/2021 2253 by Zaheer Herrera RN  Outcome: Ongoing  2/7/2021 1544 by Elmer Wallace RN  Outcome: Ongoing     Problem: Physical Regulation:  Goal: Ability to maintain vital signs within normal range will improve  Description: Ability to maintain vital signs within normal range will improve  2/7/2021 1544 by Elmer Wallace RN  Outcome: Ongoing     Problem: Skin Integrity:  Goal: Demonstration of wound healing without infection will improve  Description: Demonstration of wound healing without infection will improve  2/7/2021 2253 by Zaheer Herrera RN  Outcome: Ongoing  2/7/2021 2253 by Zaheer Herrera RN  Outcome: Ongoing  2/7/2021 1544 by Elmer Wallace RN  Outcome: Ongoing  Goal: Complications related to intravenous access or infusion will be avoided or minimized  Description: Complications related to intravenous access or infusion will be avoided or minimized  2/7/2021 1544 by Elmer Wallace RN  Outcome: Ongoing     Problem: Daily Care:  Goal: Daily care needs are met  Description: Daily care needs are met  Outcome: Ongoing     Problem: Pain:  Goal: Patient's pain/discomfort is manageable  Description: Patient's pain/discomfort is manageable  Outcome: Ongoing     Problem: Discharge Planning:  Goal: Patients continuum of care needs are met  Description: Patients continuum of care needs are met  Outcome: Ongoing

## 2021-02-08 NOTE — PROGRESS NOTES
Bedside report given to [facility]  pt in stable condition no needs at this time.  Call light within reach

## 2021-02-08 NOTE — CARE COORDINATION
Case Management Assessment  Initial Evaluation      Patient Name: Amilcar Mcdonald  YOB: 1928  Diagnosis: Colitis [K52.9]  Date / Time: 2/7/2021 11:15 AM    Admission status/Date: 02/07/2021 Inpatient   Chart Reviewed: Yes      Patient Bert Andrewty: No -pt did not answer bedside phone  Family Interviewed:  Yes - pt's daughter Sachin Martin at [phone]      Hospitalization in the last 30 days:  No      Health Care Decision Maker :  Pt did not answer bedside phone to discuss    Met with: pt's daughter Mario Curling conducted  (bedside/phone):phone    Current PCP: [address] Medicare  Precert required for SNF : Y   -unless waived due to covid-19       3 night stay required -  N    ADLS  Support Systems/Care Needs:  family  Transportation: self    Meal Preparation: wife    Housing  Living Arrangements: pt lives with his wife, grandson and grandson's girlfriend. Steps: 2-3  Intent for return to present living arrangements: Yes  Identified Issues: none    Home Care Information  Active with Home Health Care : No Agency:(Services)     Passport/Waiver : No  :                      Phone Number:    Passport/Waiver Services: n/a          Durable Medical Equiptment   DME Provider:   Equipment:   Walker_x__Cane_x__RTS___ BSC___Shower Chair___Hospital Bed___W/C____Other________  02 at ____Liter(s)---wears(frequency)_______ HHN ___ CPAP___ BiPap___   N/A____      Home O2 Use :  No    If No for home O2---if presently on O2 during hospitalization:  Yes  if yes CM to follow for potential DC O2 need  Informed of need for care provider to bring portable home O2 tank on day of discharge for nursing to connect prior to leaving:   Not Indicated  Verbalized agreement/Understanding:   Not Indicated    Community Service Affiliation  Dialysis:  No    · Agency:  · Location:  · Dialysis Schedule:  · Phone:   · Fax:     Other Community Services: n/a DISCHARGE PLAN: Explained Case Management role/services. Chart review completed. Attempted calling pt's bedside phone but there was no answer. Pt not currently a private encounter in epic. Attempted calling pt's wife at [phone] but the number was not working. Called and spoke with pt's daughter Daniel Shelton who completed the assessment. She stated that pt is normally independent and uses a walker/cane at home. She stated that pt \"rene pads\" and depends for pt as being have incontinence. She stated pt has a Pain MD in the community. She stated that pt and pt's wife cell phone is [phone] (they share) and the home number is [phone]. She denied needs or questions for CM at this time. Offered to transfer her to pt's RN for an update and she declined stating she would speak with her mother (pt's wife). Lynne dixon CM will follow for Dina Ingram. Updated pt's wife contact information in epic    CM will follow for Dina Ingram. Please notify CM if needs or concerns arise.     Laurent Santamaria MSW, IBRAHIMA

## 2021-02-09 NOTE — PROGRESS NOTES
Inpatient Physical Therapy Evaluation and Treatment    Unit: [facility]  Date:  2/9/2021  Patient Name:    Riky Herbert  Admitting diagnosis:  Colitis [K52.9]  Admit Date:  2/7/2021  Precautions/Restrictions/WB Status/ Lines/ Wounds/ Oxygen: Fall risk, Bed/chair alarm, Lines -IV and Purewick catheter, Confusion, [place] (hard of hearing) and Telemetry, telesitter    Treatment Time: 1115 - 1159  Treatment Number:  1   Timed Code Treatment Minutes: 31 minutes  Total Treatment Minutes:  41  minutes    Patient Goals for Therapy: Unable to state goals. Discharge Recommendations: SNF  DME needs for discharge: defer to facility       Therapy recommendation for EMS Transport: requires transport by cot due to need of 2 person assist for bed to chair transfers    Therapy recommendations for staff:   Assist of 2 (Min -Mod A x 2) with use of rolling walker (RW) and gait belt for all transfers and ambulation to/from [facility]  to/from chair    History of Present Illness: The patient is a 80 y.o. male w hx of DMII, Asthma arthritis, remote hx of MRSA infection, lives at home with his wife, who presents with abd pain. Pt reports his abdomen has been distended for years, but in the past several days he developed worsening pain - diffusely - worse across upper abdomen. This was associated with nausea and an episode of emesis and very poor PO intake - he does not feel like eating at all. His wife was worried and sent him to the ED. He denies diarrhea per say. He was unable to tell me when he had a BM last time. Pt has Hx of multiple DVTs - he used to be on coumadin. It appears that he is now taking eliquis instead. He reports colonoscopy many years ago (2007) - several polyps removed then. Home Health S4 Level Recommendation:  Level 3 Safety  AM-PAC Mobility Score    AM-PAC Inpatient Mobility Raw Score : 15     Preadmission Environment    Pt.  Lives with family (wife, grandson and grandson's girlfriend) Home environment:  one story home  Steps to enter first floor: 2 steps to enter bilateral handrails  Steps to second floor: sunken family room and kitchen one step without handrails  Bathroom: tub/shower unit and standard height commode  Equipment owned: [facility], walker (information taken from 's note dated 02/08/2021)    Preadmission Status:  Pt. Able to drive: Yes  Pt Fully independent with ADLs: Yes  Pt. Required assistance from family for: unknown  Pt. independent for transfers and gait and walked with Cane/walker  History of falls Unknown    Pain   No     Cognition    A&O Person, disoriented to date, location and situation  Able to follow 1 step commands    Subjective  Patient lying supine in bed with no family present. Pt agreeable to this PT eval & tx. Upper Extremity ROM/Strength  Please see OT evaluation.       Lower Extremity ROM / Strength   AROM WFL: Yes  ROM limitations: N/A    Strength Assessment (measured on a 0-5 scale):  R LE   Quad   4-   Ant Tib  4-   Hamstring 4-   Iliopsoas 4-  L LE  Quad   3   Ant Tib  3   Hamstring 3   Iliopsoas 3    Lower Extremity Sensation    WFL    Lower Extremity Proprioception:   Diminished    Coordination and Tone  Diminished    Balance  Sitting:  Fair ; Min A   Comments: ~20 min    Standing: Poor+ ; Min A  and 2 persons  Comments: ~3 min    Bed Mobility   Supine to Sit:    CGA  Sit to Supine:   Not Tested  Rolling:   Not Tested  Scooting in sitting: CGA  Scooting in supine:  Not Tested    Transfer Training     Sit to stand:   Initial attempt from EOB Mod A x 2 using RW, 2nd attempt onwards: Min A  and 2 persons  Stand to sit:   Min A  and 2 persons  Bed to Chair:   Min A  and 2 persons with use of gait belt and rolling walker (RW)    Gait gait completed as indicated below  Distance:      5 ft  Deviations (firm surface/linoleum):  decreased new, increased ATUL, staggers, decreased step length bilaterally, decreased stance time bilaterally and unsteady 4).  Gait: Ambulate 150 ft.  with  CGA and use of LRAD (least restrictive assistive device)  5). Tolerate B LE exercises 3 sets of 10-15 reps  6). Ascend/descend 2 steps with CGA with use of hand rail bilateral and LRAD (least restrictive assistive device)    Rehabilitation Potential: Good  Strengths for achieving goals include:   Pt motivated and Pt cooperative   Barriers to achieving goals include:    Complexity of condition, poor safety awareness with mild confusion. Plan    To be seen 3-5 x / week  while in acute care setting for therapeutic exercises, bed mobility, transfers, progressive gait training, balance training, and family/patient education. Signature: Tara Posey MS PT, # [id number]    If patient discharges from this facility prior to next visit, this note will serve as the Discharge Summary.

## 2021-02-09 NOTE — PROGRESS NOTES
Shift assessment complete; see flow sheet. Scheduled medications administered; See MAR. IV infusing without difficulty. O2 3 LPM nc in place. Pt denies any needs at this time. Call light within reach, bed in low locked position, bed alarm on.

## 2021-02-09 NOTE — PROGRESS NOTES
Progress Note    Admit Date:  2/7/2021    Subjective:  Mr. Riki Essex was admitted for abdominal pain. Work up showed mild sigmoid diverticulitis and diffuse colitis. He denies any diarrhea. He has chronic mild abdominal distension. No fever or elevated WBC. He had mild lactic acidosis that has resolved. He had an episode of hypoxia last night and placed on 3 L of oxygen. He is 98% on oxygen at present. He was also confused yesterday but his mentation seems normal this am. He is tolerating a clear liquid diet this am. He is not having much abdominal pain. 2/9- was agitated but calm now. Tolerating clear liquid diet. No fever. Had a 5 beat run of v tach but no other issues. Magnesium level was normal.     Objective:   BP (!) 146/76   Pulse 103   Temp 97 °F (36.1 °C) (Oral)   Resp 18   Ht 5' 9\" (1.753 m)   Wt 214 lb (97.1 kg)   SpO2 94%   BMI 31.60 kg/m²          Intake/Output Summary (Last 24 hours) at 2/9/2021 1004  Last data filed at 2/9/2021 0753  Gross per 24 hour   Intake 1926 ml   Output 2000 ml   Net -74 ml       Physical Exam:    General appearance: alert, appears stated age and cooperative  Head: Normocephalic, without obvious abnormality, atraumatic  Eyes: conjunctivae/corneas clear. PERRL, EOM's intact.   Neck: no adenopathy, no carotid bruit, no JVD, supple, symmetrical, trachea midline and thyroid not enlarged, symmetric, no tenderness/mass/nodules  Lungs: clear to auscultation bilaterally  Heart: regular rate and rhythm, S1, S2 normal, no murmur, click, rub or gallop  Abdomen: soft, minimal abdominal tenderness; bowel sounds normal; no masses,  no organomegaly  Extremities: extremities normal, atraumatic, no cyanosis or edema  Pulses: 2+ and symmetric  Skin: Skin color, texture, turgor normal. No rashes or lesions  Neurologic: Grossly normal    Scheduled Meds:   apixaban  5 mg Oral BID    latanoprost  1 drop Both Eyes Nightly    piperacillin-tazobactam  3,375 mg Intravenous Q8H  insulin lispro  0-6 Units Subcutaneous Q4H    sodium chloride flush  10 mL Intravenous 2 times per day    brimonidine  1 drop Both Eyes BID    And    timolol  1 drop Both Eyes BID    esomeprazole  40 mg Intravenous Daily       Continuous Infusions:   sodium chloride      sodium chloride 125 mL/hr at 02/08/21 1631       PRN Meds:  HYDROmorphone, loperamide, sodium chloride flush, promethazine **OR** ondansetron, polyethylene glycol, acetaminophen **OR** acetaminophen      Data:  CBC:   Recent Labs     02/07/21  1146   WBC 9.8   HGB 13.1*   HCT 39.0*   MCV 85.6        BMP:   Recent Labs     02/07/21  1146 02/08/21  0859 02/09/21  0633   * 136 142   K 4.5 4.3 3.5   CL 93* 102 109   CO2 30 26 27   BUN 63* 69* 41*   CREATININE 1.5* 1.4* 0.9     LIVER PROFILE:   Recent Labs     02/07/21  1146   AST 17   ALT 12   BILITOT 0.6   ALKPHOS 81     PT/INR: No results for input(s): PROTIME, INR in the last 72 hours. Cultures: pending    Results for Holden Sessions (MRN [id number]) as of 2/8/2021 08:37   Ref. Range 2/7/2021 12:16   SARS-CoV-2, NAAT Latest Ref Range: Not Detected  Not Detected       CT CHEST ABDOMEN PELVIS W CONTRAST   Final Result   1. Mild acute sigmoid diverticulitis without gross perforation, abscess or   obstruction. 2.  Findings suggestive of diffuse colitis. 3.  Diffuse mild small bowel distension consistent with ileus. 4.  No acute airspace disease identified. 5.  Cholelithiasis without evidence for acute cholecystitis or biliary   dilatation. 6.  Bilateral nephrolithiasis. 7.  Suspect small bladder stone versus adjacent prostate calcification. XR CHEST PORTABLE   Preliminary Result   Mild bibasilar airspace disease, representing either atelectasis or pneumonia.                 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 *      Plan:    #Acute sigmoid diverticulitis and diffuse colitis seen on CT of the abdomen. He was started on IV Zosyn. Does not have much abdominal pain today. GI has been consulted. Denies diarrhea. Advance to low fiber diet. #Acute kidney injury. Creatinine 1.5 at admission. On IV fluids. Resolved. 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 On Eliquis. #Hypoxia. Unclear etiology. Resolved. #Hyperglycemia. No history of diabetes. Given advanced age we will stop doing blood sugar test 4 times a day. Check A1c.    #Agitation/mild confusion. Unsure if he has some baseline dementia. Comment: Please note this report has been produced using speech recognition software and may contain errors related to that system including errors in grammar, punctuation, and spelling, as well as words and phrases that may be inappropriate. If there are any questions or concerns please feel free to contact the dictating provider for clarification.      Krissy Gil MD 2/9/2021 10:04 AM

## 2021-02-09 NOTE — PROGRESS NOTES
New onset of agitation and aggression that hasnt been present prior in this shift. Pt verbally and physically aggressive - a change from ten minutes ago when he was calm, pleasant, and accepting a drink of water. Attempted to fix IV and untangle his cords for him and he began Swinging at primary nurse and threatening to \"hit you upside your head\". Pt now in in left wrist restraint d/t trying to hit staff and pull out IV.

## 2021-02-09 NOTE — PROGRESS NOTES
Inpatient Occupational Therapy  Evaluation and Treatment    Unit: [facility]  Date:  2/9/2021  Patient Name:    Tr Archibald  Admitting diagnosis:  Colitis [K52.9]  Admit Date:  2/7/2021  Precautions/Restrictions/WB Status/ Lines/ Wounds/ Oxygen: Fall risk, Bed/chair alarm, Lines -IV and Purewick catheter, Confusion, [place] (hard of hearing) and Telemetry    Treatment Time:  11:15-11:59  Treatment Number: 1     Billable Treatment Time: 34 minutes   Total Treatment Time:   44  minutes    Patient Goals for Therapy:  Did not state      Discharge Recommendations: SNF  DME needs for discharge: defer to facility       Therapy recommendations for staff:   Assist of 2 with use of rolling walker (RW) for all transfers to/from BSC/chair    History of Present Illness: The patient is a 80 y. o. male w hx of DMII, Asthma arthritis, remote hx of MRSA infection, lives at home with his wife, who presents with abd pain. Pt reports his abdomen has been distended for years, but in the past several days he developed worsening pain - diffusely - worse across upper abdomen. This was associated with nausea and an episode of emesis and very poor PO intake - he does not feel like eating at all. His wife was worried and sent him to the ED. He denies diarrhea per say. He was unable to tell me when he had a BM last time. Pt has Hx of multiple DVTs - he used to be on coumadin. It appears that he is now taking eliquis instead. He reports colonoscopy many years ago (2007) - several polyps removed then.     Home Health S4 Level Recommendation:  Level 3 Safety  AM-PAC Score: AM-PAC Inpatient Daily Activity Raw Score: 16    Preadmission Environment    Pt.  Lives with family (wife)  Home environment:    one story home  Steps to enter first floor: 2 steps to enter bilateral handrails  Steps to second floor: sunken family room and kitchen one step without handrails  Bathroom: tub/shower unit and standard height commode  Equipment owned: [facility]    Preadmission Status:  Pt. Able to drive: Yes  Pt Fully independent with ADLs: Unknown  Pt. Required assistance from family for: unknown  Pt. independent for transfers and gait and walked with [employer]  History of falls Unknown     Pain   No     Cognition    A&O Person, disoriented to date, location and situation  Able to follow 1 step commands. Decreased problem solving and motor planning    Subjective  Patient lying supine in bed with no family present - no visitors present due to COVID-19 restrictions  Pt agreeable to this OT eval & tx. Upper Extremity ROM:    WFL,  pt able to perform all bed mobility, transfers, and gait without ROM limitation. Upper Extremity Strength:    Strength Assessment (measured on a 0-5 scale):    B UEs 4+/5    Limited L hand  strength and wrist extension due to swelling from IV. Upper Extremity Sensation    WFL    Upper Extremity Proprioception:  Impaired    Coordination and Tone  Impaired    Balance  Functional Sitting Balance:  Diminished  Functional Standing Balance:Impaired    Bed mobility:    Supine to sit:   Min A  Sit to supine:   Not Tested  Rolling:    Min A  Scooting in sitting:  CGA  Scooting to head of bed:   Not Tested    Bridging:   Not Tested    Transfers:    Sit to stand: Mod A  Stand to sit:  Mod A  Bed to chair:   Mod A and with use of RW  Standard toilet: Not Tested  Bed to [facility]:  Not Tested    Dressing:      UE:   Not Tested  LE:    Min A to don socks at edge of bed. Verbal cues for safety with body position. Bathing:    UE:  Not Tested  LE:  Not Tested    Eating:   Not Tested    Toileting:  Not Tested    Activity Tolerance   Pt completed therapy session with headache during transfer to chair. SpO2:   HR:   BP:     Positioning Needs:   Up in chair, call light and needs in reach.     Alarm Set    Exercise / Activities Initiated:   Hand flex/ext:  x20  Wrist flex/ext:  x20    Patient/Family Education:   Role of OT  Recommendations for DC Safe RW use/hand placement    Assessment of Deficits: Pt seen for Occupational therapy evaluation in acute care setting. Pt demonstrated decreased Activity tolerance, ADLs, IADLs, Balance , Bathing, Bed mobility, Dressing, ROM, Safety Awareness, Strength, Transfers, Cognition and Coping Skills. Pt functioning below baseline and will likely benefit from skilled occupational therapy services to maximize safety and independence. Goal(s) : To be met in 3 Visits:  1). Bed to toilet/BSC: Min A    To be met in 5 Visits:  1). Supine to/from Sit:  SBA  2). Upper Body Bathing:   SBA  3). Lower Body Bathing:   CGA  4). Upper Body Dressing:  SBA  5). Lower Body Dressing:  CGA  6). Pt to demonstrate UE exs x 15 reps with minimal cues    Rehabilitation Potential:  Good for goals listed above. Strengths for achieving goals include: Pt motivated, PLOF and Pt cooperative  Barriers to achieving goals include:  Complexity of condition     Plan: To be seen 3-5 x/wk while in acute care setting for therapeutic exercises, bed mobility, transfers, dressing, bathing, family/patient education, ADL/IADL retraining, energy conservation training.        Maria Teresa Juares, OTR/L #[id number]      If patient discharges from this facility prior to next visit, this note will serve as the Discharge Summary

## 2021-02-09 NOTE — PROGRESS NOTES
New IV placed. Pt pleasant, cooperative. Was up out of bed again and needs redirecting. Does not know where he is.

## 2021-02-09 NOTE — PROGRESS NOTES
RADHIKA Gunn informed pt still c/o abd pain when taking a drink and/or when eating food. New orders placed.

## 2021-02-09 NOTE — PLAN OF CARE
Problem: Falls - Risk of:  Goal: Will remain free from falls  Description: Will remain free from falls  2/9/2021 0039 by Da Guthrie RN  Outcome: Ongoing  2/8/2021 1058 by Hailey Dawson RN  Outcome: Ongoing  Goal: Absence of physical injury  Description: Absence of physical injury  2/9/2021 0039 by Da Gtuhrie RN  Outcome: Ongoing  2/8/2021 1058 by Hailey Dawson RN  Outcome: Ongoing     Problem: Nutritional:  Goal: Nutritional status will improve  Description: Nutritional status will improve  2/9/2021 0039 by Da Guthrie RN  Outcome: Ongoing  2/8/2021 1058 by Hailey Dawson RN  Outcome: Ongoing     Problem: Physical Regulation:  Goal: Ability to maintain vital signs within normal range will improve  Description: Ability to maintain vital signs within normal range will improve  2/9/2021 0039 by Da Guthrie RN  Outcome: Ongoing  2/8/2021 1058 by Hailey Dawson RN  Outcome: Ongoing     Problem: Skin Integrity:  Goal: Demonstration of wound healing without infection will improve  Description: Demonstration of wound healing without infection will improve  2/9/2021 0039 by Da Guthrie RN  Outcome: Ongoing  2/8/2021 1058 by Hailey Dawson RN  Outcome: Ongoing  Goal: Complications related to intravenous access or infusion will be avoided or minimized  Description: Complications related to intravenous access or infusion will be avoided or minimized  2/9/2021 0039 by Da Guthrie RN  Outcome: Ongoing  2/8/2021 1058 by Hailey Dawson RN  Outcome: Ongoing  Goal: Will show no infection signs and symptoms  Description: Will show no infection signs and symptoms  2/9/2021 0039 by Da Guthrie RN  Outcome: Ongoing  2/8/2021 1058 by Hailey Dawson RN  Outcome: Ongoing  Goal: Absence of new skin breakdown  Description: Absence of new skin breakdown  2/9/2021 0039 by Da Guthrie RN  Outcome: Ongoing  2/8/2021 1058 by Hailey Dawson RN Outcome: Ongoing     Problem: Infection:  Goal: Will remain free from infection  Description: Will remain free from infection  2/9/2021 0039 by Sherie Watkins RN  Outcome: Ongoing  2/8/2021 1058 by Esteban Morales RN  Outcome: Ongoing     Problem: Safety:  Goal: Free from accidental physical injury  Description: Free from accidental physical injury  2/9/2021 0039 by Sherie Watkins RN  Outcome: Ongoing  2/8/2021 1058 by Esteban Morales RN  Outcome: Ongoing  Goal: Free from intentional harm  Description: Free from intentional harm  2/9/2021 0039 by Sherie Watkins RN  Outcome: Ongoing  2/8/2021 1058 by Esteban Morales RN  Outcome: Ongoing     Problem: Daily Care:  Goal: Daily care needs are met  Description: Daily care needs are met  2/9/2021 0039 by Sherie Watkins RN  Outcome: Ongoing  2/8/2021 1058 by Esteban Morales RN  Outcome: Ongoing     Problem: Pain:  Goal: Patient's pain/discomfort is manageable  Description: Patient's pain/discomfort is manageable  2/9/2021 0039 by Sherie Watkins RN  Outcome: Ongoing  2/8/2021 1058 by Esteban Morales RN  Outcome: Ongoing     Problem: Skin Integrity:  Goal: Skin integrity will stabilize  Description: Skin integrity will stabilize  2/9/2021 0039 by Sherie Watkins RN  Outcome: Ongoing  2/8/2021 1058 by Esteban Morales RN  Outcome: Ongoing     Problem: Discharge Planning:  Goal: Patients continuum of care needs are met  Description: Patients continuum of care needs are met  2/9/2021 0039 by Sherie Watkins RN  Outcome: Ongoing  2/8/2021 1058 by Esteban Morales RN  Outcome: Ongoing

## 2021-02-09 NOTE — PROGRESS NOTES
Shift assessment complete- see flow sheet. Patient is A/Ox4. VSS. Morning medications given without difficulty. Currently on room air, SpO2 WNL. Lung sounds diminished. Denies shortness of breath. Pt calm and cooperative. R wrist restraint removed. Pt using pure wick successfully. Good UOP. Patient denies any further needs. Call light explained and in reach. Bed alarm on. Will continue to monitor.

## 2021-02-09 NOTE — PROGRESS NOTES
Handoff report and transfer of care given to [facility]. Pt in stable condition. Call light within reach. Bed locked in lowest position. Denies further needs at this time.

## 2021-02-09 NOTE — PROGRESS NOTES
Pt has been up twice and needed reinforcement. Is occasionally A/O x4, but goes in and out. Speaking \"in tongues\" to the [address] and praying. Seems agitated until you talk to him and then he is pleasant.

## 2021-02-09 NOTE — CARE COORDINATION
INTERDISCIPLINARY PLAN OF CARE CONFERENCE    Date/Time: 2/9/2021 1:13 PM  Completed by: IBRAHIMA Vega. Case Management      Patient Name:  Bebeto Candelaria  YOB: 1928  Admitting Diagnosis: Colitis [K52.9]     Admit Date/Time:  2/7/2021 11:15 AM    Chart reviewed. Interdisciplinary team contacted or reviewed plan related to patient progress and discharge plans. Disciplines included Case Management, Nursing, and Dietitian. Current Status: Ongoing. Covid-19 negative 02/07/2021 via labs in epic  PT/OT recommendation for discharge plan of care: SNF    Expected D/C Disposition:  Home vs SNF  Confirmed plan with patient and/or family Yes confirmed with: pt's wife Bertha Duron at [phone] and pt     Discharge Plan Comments: Chart review completed. SW notes that PT/OT recommended SNF. Called and spoke with pt's wife Tommie Davidson to discuss therapy recommendations. Explained recommendations for SNF and that at times requiring min assist of 2 people (see note from [id number] [facility], PT from 02/09/2021). Tommie Davidson stated \"if he would go that would be a good thing\". Also discussed HHC and explained [facility] wouldn't come out daily as she was asking about returning home. Explained CM would discuss the recommendations for SNF when appropriate with pt. Met with pt at bedside and discussed therapy recommendations for SNF, along with writer has spoken with his wife. Pt stated that he would be open to SNF and stated \"if we can afford it\". Explained he would go there under his insurance if they approved it and he stated understanding. Provided pt with a SNF list to review and encouraged him to review as CM will follow up with him on options. He stated understanding and denied needs or questions for CM at this time. Home O2 in place on admit: No    CM will continue to follow and assist as needed with discharge plans. Please notify CM if needs or concerns arise.      IBRAHIMA Vega

## 2021-02-09 NOTE — PLAN OF CARE
Problem: Falls - Risk of:  Goal: Will remain free from falls  Description: Will remain free from falls  Outcome: Ongoing  Goal: Absence of physical injury  Description: Absence of physical injury  Outcome: Ongoing     Problem: Nutritional:  Goal: Nutritional status will improve  Description: Nutritional status will improve  Outcome: Ongoing     Problem: Physical Regulation:  Goal: Ability to maintain vital signs within normal range will improve  Description: Ability to maintain vital signs within normal range will improve  Outcome: Ongoing     Problem: Skin Integrity:  Goal: Demonstration of wound healing without infection will improve  Description: Demonstration of wound healing without infection will improve  Outcome: Ongoing  Goal: Complications related to intravenous access or infusion will be avoided or minimized  Description: Complications related to intravenous access or infusion will be avoided or minimized  Outcome: Ongoing  Goal: Will show no infection signs and symptoms  Description: Will show no infection signs and symptoms  Outcome: Ongoing  Goal: Absence of new skin breakdown  Description: Absence of new skin breakdown  Outcome: Ongoing     Problem: Infection:  Goal: Will remain free from infection  Description: Will remain free from infection  Outcome: Ongoing     Problem: Safety:  Goal: Free from accidental physical injury  Description: Free from accidental physical injury  Outcome: Ongoing  Goal: Free from intentional harm  Description: Free from intentional harm  Outcome: Ongoing     Problem: Daily Care:  Goal: Daily care needs are met  Description: Daily care needs are met  Outcome: Ongoing     Problem: Pain:  Goal: Patient's pain/discomfort is manageable  Description: Patient's pain/discomfort is manageable  Outcome: Ongoing     Problem: Skin Integrity:  Goal: Skin integrity will stabilize  Description: Skin integrity will stabilize  Outcome: Ongoing     Problem: Discharge Planning: Goal: Patients continuum of care needs are met  Description: Patients continuum of care needs are met  Outcome: Ongoing     Problem: Restraint Use - Nonviolent/Non-Self-Destructive Behavior:  Goal: Absence of restraint indications  Description: Absence of restraint indications  Outcome: Ongoing  Goal: Absence of restraint-related injury  Description: Absence of restraint-related injury  Outcome: Ongoing

## 2021-02-09 NOTE — PROGRESS NOTES
Progress Note  Date:2021       Room:0227/0227-02  Patient Name:Trevon Mtz     YOB: 1928     Age:89 y.o. Subjective    Subjective:  Symptoms:  Improved. Pain:  He reports no pain. Review of Systems  Objective         Vitals Last 24 Hours:  TEMPERATURE:  Temp  Av.9 °F (36.6 °C)  Min: 97.7 °F (36.5 °C)  Max: 98.2 °F (36.8 °C)  RESPIRATIONS RANGE: Resp  Av.5  Min: 18  Max: 20  PULSE OXIMETRY RANGE: SpO2  Av.4 %  Min: 90 %  Max: 94 %  PULSE RANGE: Pulse  Av.3  Min: 101  Max: 105  BLOOD PRESSURE RANGE: Systolic (27EGP), WHU:704 , Min:102 , KWO:997   ; Diastolic (77OKO), LGP:99, Min:64, Max:88    I/O (24Hr): Intake/Output Summary (Last 24 hours) at 2021 0539  Last data filed at 2021 0446  Gross per 24 hour   Intake 1926 ml   Output 800 ml   Net 1126 ml     Objective:  General Appearance: In no acute distress and not in pain. Vital signs: (most recent): Blood pressure (!) 140/71, pulse 102, temperature 97.8 °F (36.6 °C), resp. rate 20, height 5' 9\" (1.753 m), weight 214 lb (97.1 kg), SpO2 90 %. Abdomen: Abdomen is soft. Bowel sounds are normal.   There is no abdominal tenderness. Labs/Imaging/Diagnostics    Labs:  CBC:  Recent Labs     21  1146   WBC 9.8   RBC 4.56   HGB 13.1*   HCT 39.0*   MCV 85.6   RDW 15.6*        CHEMISTRIES:  Recent Labs     21  1146 21  0859   * 136   K 4.5 4.3   CL 93* 102   CO2 30 26   BUN 63* 69*   CREATININE 1.5* 1.4*   GLUCOSE 189* 144*   MG  --  2.80*     PT/INR:No results for input(s): PROTIME, INR in the last 72 hours. APTT:No results for input(s): APTT in the last 72 hours.   LIVER PROFILE:  Recent Labs     21  1146   AST 17   ALT 12   BILITOT 0.6   ALKPHOS 81       Imaging Last 24 Hours:  Xr Chest Portable    Result Date: 2021 EXAMINATION: ONE XRAY VIEW OF THE CHEST 2/7/2021 11:41 am COMPARISON: None. HISTORY: ORDERING SYSTEM PROVIDED HISTORY: Weakness TECHNOLOGIST PROVIDED HISTORY: Reason for exam:->Weakness Reason for Exam: weakness Acuity: Acute Type of Exam: Initial FINDINGS: A single frontal view of the chest was performed. There is no acute skeletal abnormality. The heart size and mediastinal contours are within normal limits. There are subtle curvilinear and hazy opacities within the bilateral lung bases, which could represent either atelectasis or pneumonia. The mid and upper lung zones are clear. There is no evidence a pneumothorax. Mild bibasilar airspace disease, representing either atelectasis or pneumonia.      Ct Chest Abdomen Pelvis W Contrast    Result Date: 2/7/2021 EXAMINATION: CT OF THE CHEST, ABDOMEN, AND PELVIS WITH CONTRAST 2/7/2021 12:47 pm TECHNIQUE: CT of the chest, abdomen and pelvis was performed with the administration of intravenous contrast. Multiplanar reformatted images are provided for review. Dose modulation, iterative reconstruction, and/or weight based adjustment of the mA/kV was utilized to reduce the radiation dose to as low as reasonably achievable. COMPARISON: Chest radiograph today HISTORY: ORDERING SYSTEM PROVIDED HISTORY: Abdominal pain, nausea, vomiting TECHNOLOGIST PROVIDED HISTORY: Reason for exam:->Abdominal pain, nausea, vomiting Additional Contrast?->None Decision Support Exception->Emergency Medical Condition (MA) Reason for Exam: FATIGUE, ABD PAIN FINDINGS: Chest: Mediastinum: The heart and great vessels reveal no acute findings. No pericardial effusion. No enlarged or suspicious-appearing lymph nodes are identified. Diffuse esophageal wall thickening without significant distension. Lungs/pleura: Respiratory motion artifact. No consolidation, evidence for edema or effusion. Linear opacities in the lung bases likely represent subsegmental atelectasis or scarring. The central airway is patent. Soft Tissues/Bones: No acute findings. Mild bilateral gynecomastia, right greater than left. Abdomen/Pelvis: Organs: Distended gallbladder with small stone present. No wall thickening or inflammatory change. No evidence for biliary dilatation or choledocholithiasis. The liver, pancreas, spleen, adrenals and kidneys reveal no acute findings. Bilateral parapelvic and intraparenchymal renal cysts are noted. Bilateral nephrolithiasis. No stone identified in either ureter. GI/Bowel: Sigmoid diverticulitis is present without gross perforation, abscess or obstruction. Mild diffuse small bowel distension with fluid without transition point. Moderate semi solid stool burden in the proximal colon with mild gaseous distension.   Diffuse nodular colonic wall thickening is also present, suggestive of colitis. Pelvis: Prostate enlargement, impressing upon the bladder. Suspect posterior bladder stone near the prostate measuring 13 mm. Peritoneum/Retroperitoneum: No free air or free fluid. The aorta is normal in caliber. The visceral branches are patent. Calcified atheromatous plaque is present. No lymphadenopathy. Bones/Soft Tissues: No acute osseous or acute soft tissue abnormality identified. Multilevel degenerative change in the spine with lower lumbar facet arthropathy. 1.  Mild acute sigmoid diverticulitis without gross perforation, abscess or obstruction. 2.  Findings suggestive of diffuse colitis. 3.  Diffuse mild small bowel distension consistent with ileus. 4.  No acute airspace disease identified. 5.  Cholelithiasis without evidence for acute cholecystitis or biliary dilatation. 6.  Bilateral nephrolithiasis. 7.  Suspect small bladder stone versus adjacent prostate calcification. Assessment//Plan           Hospital Problems           Last Modified POA    Diverticulitis of colon 2/8/2021 Yes    KERMIT (acute kidney injury) ([address] 2/8/2021 Yes    Chronic deep vein thrombosis (DVT) of proximal vein of lower extremity ([address] 2/8/2021 Yes    Hypoxia 2/8/2021 Yes    Hyperglycemia 2/8/2021 Yes        Assessment & Plan  80 y. o. male w hx of DMII, Asthma, arthritis, remote hx of DVT's on Elquis, who presents with several days of diffuse abd pain, worse in upper abd, associated w N/V. He reports his abdomen has been distended for years though. CT revealed an uncomplicated acute sigmoid diverticulitis along w possible mild diffuse colitis and SI ileus w gallstones and constipation. Last colonoscopy is reportedly in 2007. His Sx responded to Abx's and Miralax.     Plan:   1. Supportive care  2. Agree w Abx's and daily Miralax  3. Will advance to low fiber diet  4. Will follow as outpatient w a colonoscopy in 4-6 weeks  5.  Pls call if needed     Iram Colmenares MD

## 2021-02-10 NOTE — PROGRESS NOTES
notified DR Ninfa Christian that pt gets confused and talks to  people who are not there also yells out . orders given to d/c posy belt and to removed Avasys from room. [facility] Bed rosalia on call light in reach.

## 2021-02-10 NOTE — PROGRESS NOTES
Pt noted with increase agitation, risperidone given per PRN orders. Bed alarm on call light in reach.

## 2021-02-10 NOTE — PROGRESS NOTES
Occupational Therapy Daily Treatment Note    Unit: 2 [place]  Date:  2/10/2021  Patient Name:    Yvonne Mejia  Admitting diagnosis:  Colitis [K52.9]  Admit Date:  2/7/2021  Precautions/Restrictions:  Fall risk, Bed/chair alarm, Lines -IV, Confusion, [place] (hard of hearing), Telemetry and telesitter      Discharge Recommendations: SNF  DME needs for discharge: defer to facility       Therapy recommendations for staff:   Assist of 1 with use of rolling walker (RW) and gait belt for all transfers and ambulation within room    AM-PAC Score: AM-PAC Inpatient Daily Activity Raw Score: 16  Home Health S4 Level: NA       Treatment Time:  0073-4651  Treatment number:  2    Timed code treatment minutes: 40 minutes  Total treatment minutes:   40 minutes    History of Present Illness: The patient is a 80 y. o. male w hx of DMII, Asthma arthritis, remote hx of MRSA infection, lives at home with his wife, who presents with abd pain. Pt reports his abdomen has been distended for years, but in the past several days he developed worsening pain - diffusely - worse across upper abdomen. This was associated with nausea and an episode of emesis and very poor PO intake - he does not feel like eating at all. His wife was worried and sent him to the ED. He denies diarrhea per say. He was unable to tell me when he had a BM last time. Pt has Hx of multiple DVTs - he used to be on coumadin. It appears that he is now taking eliquis instead. He reports colonoscopy many years ago (2007) - several polyps removed then.     Subjective:  Patient up in chair writing on paper, agreeable to therapy.     Pain   No  Rating: NA  Location:  Pain Medicine Status: No request made      Bed Mobility:   Supine to Sit:  Not Tested  Sit to Supine:  Not Tested  Rolling:           Not Tested  Scooting:        SBA    Transfer Training:   Sit to stand:   CGA  Stand to sit:  CGA  Bed to Chair:  CGA with RW and gait belt, cues for hand placement Bed to [facility]:   Not Tested  Standard toilet:   Not Tested    Activity Tolerance   Pt completed therapy session with No adverse symptoms noted w/activity    ADL Training:   Upper body dressing: Not Tested  Upper body bathing:  Not Tested  Lower body dressing: Mod A change Depends  Lower body bathing:  Not Tested  Toileting: Mod A wipe periarea after stool noted in Depends   Grooming/Hygiene:  SBA clean hands with wipe after cleansing periarea    Therapeutic Exercise: all completed bilaterally unless indicated  Shoulder flex/ext:  x10, hands clasped and punching up toward ceiling  Scapular retraction:  x10    Patient Education:   Role of OT  Safe RW use/hand placement    Positioning Needs:   Pt up in chair, alarm set, positioned in proper neutral alignment and pressure relief provided. Call light provided and all needs within reach    Family Present:  No    Assessment: Making significant progress toward goals, motivated to engage in exercises. Continue. GOALS  To be met in 3 Visits:  1). Bed to toilet/BSC: Min A     To be met in 5 Visits:  1). Supine to/from Sit:             SBA  2). Upper Body Bathing:         SBA  3). Lower Body Bathing:         CGA  4). Upper Body Dressing:       SBA  5). Lower Body Dressing:       CGA  6).  Pt to demonstrate UE exs x 15 reps with minimal cues      Plan: cont with [address] OTR/L 6180        If patient discharges from this facility prior to next visit, this note will serve as the Discharge Summary

## 2021-02-10 NOTE — PROGRESS NOTES
Shift assessment complete. See doc flow. Nightly medications given see MAR. Patient with baseline dementia. Oriented to self. Patient in bed, IV leaking w/ zosyn infusing. IV removed, will be replaced to continue zosyn infusion. Call light and bedside table within easy reach. Will continue to monitor.

## 2021-02-10 NOTE — PROGRESS NOTES
Progress Note  Date:2/10/2021       Room:[facility]/0227-02  Patient Name:Trevon Mtz     YOB: 1928     Age:89 y.o. Subjective    Subjective:  Symptoms:  Stable. Pain:  He reports no pain. Review of Systems  Objective         Vitals Last 24 Hours:  TEMPERATURE:  Temp  Av.1 °F (36.7 °C)  Min: 97 °F (36.1 °C)  Max: 98.7 °F (37.1 °C)  RESPIRATIONS RANGE: Resp  Av  Min: 18  Max: 18  PULSE OXIMETRY RANGE: SpO2  Av.3 %  Min: 94 %  Max: 97 %  PULSE RANGE: Pulse  Av.4  Min: 92  Max: 120  BLOOD PRESSURE RANGE: Systolic (94ETL), TIZ:549 , Min:137 , QNS:666   ; Diastolic (99NWC), ILENE:32, Min:63, Max:86    I/O (24Hr): Intake/Output Summary (Last 24 hours) at 2/10/2021 0526  Last data filed at 2021 204  Gross per 24 hour   Intake 747 ml   Output 1200 ml   Net -453 ml     Objective:  General Appearance:  Not in pain. Vital signs: (most recent): Blood pressure (!) 155/86, pulse 92, temperature 98 °F (36.7 °C), temperature source Axillary, resp. rate 18, height 5' 9\" (1.753 m), weight 214 lb (97.1 kg), SpO2 96 %. Abdomen: Abdomen is soft. Bowel sounds are normal.   There is no abdominal tenderness. Labs/Imaging/Diagnostics    Labs:  CBC:  Recent Labs     21  1146   WBC 9.8   RBC 4.56   HGB 13.1*   HCT 39.0*   MCV 85.6   RDW 15.6*        CHEMISTRIES:  Recent Labs     21  1146 21  0859 21  0633   * 136 142   K 4.5 4.3 3.5   CL 93* 102 109   CO2 30 26 27   BUN 63* 69* 41*   CREATININE 1.5* 1.4* 0.9   GLUCOSE 189* 144* 94   MG  --  2.80*  --      PT/INR:No results for input(s): PROTIME, INR in the last 72 hours. APTT:No results for input(s): APTT in the last 72 hours. LIVER PROFILE:  Recent Labs     21  1146 21  0633   AST 17 18   ALT 12 11   BILIDIR  --  <0.2   BILITOT 0.6 <0.2   ALKPHOS 81 68       Imaging Last 24 Hours:  No results found.   Assessment//Plan           Hospital Problems           Last Modified POA Diverticulitis of colon 2/8/2021 Yes    KERMIT (acute kidney injury) ([facility] 2/8/2021 Yes    Chronic deep vein thrombosis (DVT) of proximal vein of lower extremity ([facility] 2/8/2021 Yes    Hypoxia 2/8/2021 Yes    Hyperglycemia 2/8/2021 Yes        Assessment & Plan  89 y. o. male w hx of DMII, Asthma, arthritis, remote hx of DVT's on Elquis, who presents with several days of diffuse abd pain, worse in upper abd, associated w N/V. He reports his abdomen has been distended for years though. CT revealed an uncomplicated acute sigmoid diverticulitis along w possible mild diffuse colitis and SI ileus w gallstones and constipation. Last colonoscopy is reportedly in 2007. His Sx responded to Abx's and Miralax. Patient is currently in restraints for combativeness.     Plan:   1. Supportive care  2. Agree w Abx's and daily Miralax  3. Will advance to low fiber diet  4. Will follow as outpatient w a colonoscopy in 4-6 weeks  5.  Pls call if needed     MD Claudia Goode) [phone]    Electronically signed by Stefan Altamirano MD on 2/10/21 at 5:26 AM EST

## 2021-02-10 NOTE — CARE COORDINATION
INTERDISCIPLINARY PLAN OF CARE CONFERENCE    Date/Time: 2/10/2021 1:13 PM  Completed by: Mihaela Goff, Case Management      Patient Name:  Jayla Dupont  YOB: 1928  Admitting Diagnosis: Colitis [K52.9]     Admit Date/Time:  2/7/2021 11:15 AM    Chart reviewed. Interdisciplinary team contacted or reviewed plan related to patient progress and discharge plans. Disciplines included Case Management, Nursing, and Dietitian. Current Status: Stable  PT/OT recommendation for discharge plan of care: SNF    Expected D/C Disposition:  Rehab  Confirmed plan with patient and/or family No   Discharge Plan Comments: Reviewed chart and met with pt who is agreeable to STR. Noted pt is confused. Attempted to call wife and confirm plan but unable to reach via phone x 2. Noted MD states wife would like [facility] [place]. Referral called to [facility] [place] for review. Received call back from [facility] who states pt has to be restraint free x 24 hrs and then will see how he does and follow up in AM on acceptance/denial of pt. MD aware. Will cont to follow.     Home O2 in place on admit: No  Pt informed of need to bring portable home O2 tank on day of discharge for nursing to connect prior to leaving:  Not Indicated  Verbalized agreement/Understanding:  Not Indicated

## 2021-02-10 NOTE — PROGRESS NOTES
reported pt HR over night up to 140 ( pt was very confused over night still yelling out now posy placed at 0200  ) . right now . /78. also potassium is 3.3 no PRN noted . placed order for EKG. perfect serve sent to DR Dorothy Crenshaw

## 2021-02-10 NOTE — PROGRESS NOTES
Patient placed in posey restraint due to pt continuously trying to get out of bed. Pt in combative with staff when trying to perform pt care. Pt repositioned in bed for comfort & safety. Tele-sitter in place. Call light within reach. Will continue to monitor.

## 2021-02-10 NOTE — PROGRESS NOTES
Progress Note    Admit Date:  2/7/2021    Subjective:  Mr. Juju Wright was admitted for abdominal pain. Work up showed mild sigmoid diverticulitis and diffuse colitis. He denies any diarrhea. He has chronic mild abdominal distension. No fever or elevated WBC. He had mild lactic acidosis that has resolved. He had an episode of hypoxia last night and placed on 3 L of oxygen. He is 98% on oxygen at present. He was also confused yesterday but his mentation seems normal this am. He is tolerating a clear liquid diet this am. He is not having much abdominal pain. 2/9- was agitated but calm now. Tolerating clear liquid diet. No fever. Had a 5 beat run of v tach but no other issues. Magnesium level was normal.     2/10- having intermittent agitation. Was restrained. No diarrhea. Constipation resolved. No fever. Objective:   /78   Pulse 113   Temp 97.3 °F (36.3 °C) (Oral)   Resp 18   Ht 5' 9\" (1.753 m)   Wt 214 lb (97.1 kg)   SpO2 96%   BMI 31.60 kg/m²          Intake/Output Summary (Last 24 hours) at 2/10/2021 0855  Last data filed at 2/9/2021 2047  Gross per 24 hour   Intake 747 ml   Output    Net 747 ml       Physical Exam:    General appearance: alert, appears stated age and cooperative  Head: Normocephalic, without obvious abnormality, atraumatic  Eyes: conjunctivae/corneas clear. PERRL, EOM's intact.   Neck: no adenopathy, no carotid bruit, no JVD, supple, symmetrical, trachea midline and thyroid not enlarged, symmetric, no tenderness/mass/nodules  Lungs: clear to auscultation bilaterally  Heart: increased rate and regular rhythm, S1, S2 normal, no murmur, click, rub or gallop  Abdomen: soft, minimal abdominal tenderness; bowel sounds normal; no masses,  no organomegaly  Extremities: extremities normal, atraumatic, no cyanosis or edema  Pulses: 2+ and symmetric  Skin: Skin color, texture, turgor normal. No rashes or lesions  Neurologic: Grossly normal    Scheduled Meds:   apixaban  5 mg Oral BID  latanoprost  1 drop Both Eyes Nightly    piperacillin-tazobactam  3,375 mg Intravenous Q8H    sodium chloride flush  10 mL Intravenous 2 times per day    brimonidine  1 drop Both Eyes BID    And    timolol  1 drop Both Eyes BID    esomeprazole  40 mg Intravenous Daily       Continuous Infusions:      PRN Meds:  oxyCODONE, HYDROmorphone, loperamide, sodium chloride flush, promethazine **OR** ondansetron, polyethylene glycol, acetaminophen **OR** acetaminophen      Data:  CBC:   Recent Labs     02/07/21  1146   WBC 9.8   HGB 13.1*   HCT 39.0*   MCV 85.6        BMP:   Recent Labs     02/08/21  0859 02/09/21  0633 02/10/21  0527    142 141   K 4.3 3.5 3.3*    109 108   CO2 26 27 25   BUN 69* 41* 24*   CREATININE 1.4* 0.9 0.9     LIVER PROFILE:   Recent Labs     02/07/21  1146 02/09/21  0633   AST 17 18   ALT 12 11   LIPASE  --  8.0*   BILIDIR  --  <0.2   BILITOT 0.6 <0.2   ALKPHOS 81 68     PT/INR: No results for input(s): PROTIME, INR in the last 72 hours. Cultures: pending    Results for Rodolfo Vora (MRN [id number]) as of 2/8/2021 08:37   Ref. Range 2/7/2021 12:16   SARS-CoV-2, NAAT Latest Ref Range: Not Detected  Not Detected       CT CHEST ABDOMEN PELVIS W CONTRAST   Final Result   1. Mild acute sigmoid diverticulitis without gross perforation, abscess or   obstruction. 2.  Findings suggestive of diffuse colitis. 3.  Diffuse mild small bowel distension consistent with ileus. 4.  No acute airspace disease identified. 5.  Cholelithiasis without evidence for acute cholecystitis or biliary   dilatation. 6.  Bilateral nephrolithiasis. 7.  Suspect small bladder stone versus adjacent prostate calcification. XR CHEST PORTABLE   Final Result   Mild bibasilar airspace disease, representing either atelectasis or pneumonia.                 Assessment:  Active Problems:    Diverticulitis of colon    KERMIT (acute kidney injury) ([facility] [address] Chronic deep vein thrombosis (DVT) of proximal vein of lower extremity (HCC)    Hypoxia    Hyperglycemia  Resolved Problems:    * No resolved hospital problems. *      Plan:    #Acute sigmoid diverticulitis and diffuse colitis seen on CT of the abdomen. He was started on IV Zosyn. Does not have much abdominal pain today. GI has been consulted. Denies diarrhea. Advance to low fiber diet. Has had multiple BM. #Acute kidney injury. Creatinine 1.5 at admission. On IV fluids. Resolved. 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 On Eliquis. #Hypoxia. Unclear etiology. Resolved. #Hyperglycemia. No history of diabetes. Given advanced age we will stop doing blood sugar test 4 times a day. A1C normal.     #Agitation/mild confusion. I suspect Alzheimer's dementia. Discussed with wife. May need Risperidone if he is still agitated. Discharge planning to go to rehab. Comment: Please note this report has been produced using speech recognition software and may contain errors related to that system including errors in grammar, punctuation, and spelling, as well as words and phrases that may be inappropriate. If there are any questions or concerns please feel free to contact the dictating provider for clarification.      Paris Domingo MD 2/10/2021 8:55 AM

## 2021-02-10 NOTE — PROGRESS NOTES
Inpatient Physical Therapy Daily Treatment Note    Unit: [address]  Date:  2/10/2021  Patient Name:    Yokasta Clarke  Admitting diagnosis:  Colitis [K52.9]  Admit Date:  2/7/2021  Precautions/Restrictions:  Fall risk, Bed/chair alarm, Lines -IV, Confusion, [place] (hard of hearing) and Telemetry, telesitter      Discharge Recommendations: SNF  DME needs for discharge: defer to facility       Therapy recommendation for EMS Transport: can transport by wheelchair    Therapy recommendations for staff:   Assist of 1 with use of rolling walker (RW) and gait belt for all transfers and ambulation to/from chair  to/from bathroom    History of Present Illness: The patient is a 80 y. o. male w hx of DMII, Asthma arthritis, remote hx of MRSA infection, lives at home with his wife, who presents with abd pain. Pt reports his abdomen has been distended for years, but in the past several days he developed worsening pain - diffusely - worse across upper abdomen. This was associated with nausea and an episode of emesis and very poor PO intake - he does not feel like eating at all. His wife was worried and sent him to the ED. He denies diarrhea per say. He was unable to tell me when he had a BM last time. Pt has Hx of multiple DVTs - he used to be on coumadin. It appears that he is now taking eliquis instead. He reports colonoscopy many years ago (2007) - several polyps removed then.     Home Health S4 Level Recommendation: Level 3 Safety  AM-PAC Mobility Score   AM-PAC Inpatient Mobility Raw Score : 17     Treatment Time: 1636 - 1605  Treatment number: 2  Timed Code Treatment Minutes: 29 minutes  Total Treatment Minutes:  29  minutes    Cognition    A&O Person   Able to follow 1 step commands, patient needed reorientation to therapeutic tasks to complete them. Patient got distracted easily with low attention span and poor safety awareness.     Subjective  Patient sitting up in chair with no family present Pt agreeable to this PT tx. Pain   No  Location: N/A  Rating:    NA/10  Pain Medicine Status: Denies need     Bed Mobility   Supine to Sit:    Not Tested  Sit to Supine:   Not Tested  Rolling:   Not Tested  Scooting:   SBA    Transfer Training     Sit to stand:   CGA  Stand to sit:   CGA  Bed to Chair:   CGA with use of gait belt and rolling walker (RW)    Gait Training gait completed as indicated below  Distance:      40 ft  Deviations (firm surface/linoleum):  decreased new, increased ATUL, decreased step length bilaterally, decreased stance time bilaterally and backward trunk lean. Assistive Device Used:    gait belt and rolling walker (RW)  Level of Assist:    CGA  Comment: Patient needed verbal and tactile cueing for environmental screening, safety awareness, walker negotiation and anterior weight shifting. Stair Training deferred, pt unsafe/not appropriate to complete stairs at this time  # of Steps:   N/A  Level of Assist:  Not Tested  UE Support:  NA  Assistive Device:  N/A  Pattern:   N/A  Comments:     Therapeutic Exercise all completed bilaterally unless indicated  Ankle Pumps x 20 reps  LAQ x 20 reps  Hip adduction/pillow squeeze: x 20 reps  sit to stand reps using armrests with SBA for 7 reps. Balance  Sitting:  Fair -; CGA  Comments:     Standing: Fair -; CGA  Comments: ~8 min. Patient Education      Role of PT, POC, Discharge recommendations, DC recommendations, safety awareness, transfer techniques, pursed lip breathing, energy conservation, pacing activity and calling for assist with mobility. Positioning Needs       Pt up in chair, alarm set, positioned in proper neutral alignment and pressure relief provided. Call light provided and all needs within reach    ROM Measurements N/A  Knee Flexion:  Knee Extension:     Activity Tolerance   Pt completed therapy session with No adverse symptoms noted w/activity.     Other  N/A    Assessment : Patient showed improvement in functional transfers and ambulation today with improved standing balance needing one person assist.  Recommending SNF upon discharge as patient functioning well below baseline, demonstrates good rehab potential and unable to return home due to limited or no family support, inability to negotiate stairs to enter home/bedroom/bathroom, burden of care beyond caregiver ability, home environment not conducive to patient recovery and limited safety awareness. Goals (all goals ongoing unless otherwise indicated)  To be met in 3 visits:  1). Independent with LE Ex x 10 reps     To be met in 6 visits:  1). Supine to/from sit: Supervision  2). Sit to/from stand: CGA  3). Bed to chair: CGA  4). Gait: Ambulate 150 ft.  with  CGA and use of LRAD (least restrictive assistive device)  5). Tolerate B LE exercises 3 sets of 10-15 reps  6). Ascend/descend 2 steps with CGA with use of hand rail bilateral and LRAD (least restrictive assistive device)    Plan   Continue with plan of care. Signature: Anupam Garcia, MS PT, # [phone]    If patient discharges from this facility prior to next visit, this note will serve as the Discharge Summary.

## 2021-02-11 NOTE — PROGRESS NOTES
Pt A/O to self, re-orientated to place, time, situation. VSS. Pt unlabored, respirations even & easy. No distress noted. Shift assessment complete. See flowsheet. PM meds given. See MAR. Denies needs at this time. Bed alarm on. Bed in low position. Call light within reach. Will continue to monitor.

## 2021-02-11 NOTE — PROGRESS NOTES
4 Eyes Skin Assessment     The patient is being assess for   Transfer to New Unit    I agree that 2 RN's have performed a thorough Head to Toe Skin Assessment on the patient. ALL assessment sites listed below have been assessed. Areas assessed by both nurses:   [x]   Head, Face, and Ears   [x]   Shoulders, Back, and Chest, Abdomen  [x]   Arms, Elbows, and Hands   [x]   Coccyx, Sacrum, and Ischium  [x]   Legs, Feet, and Heels        Brown discoloration BLE,coccyx red. **SHARE this note so that the co-signing nurse is able to place an eSignature**    Co-signer eSignature: Electronically signed by Leonidas Christian RN on 2/11/21 at 3:17 PM EST    Does the Patient have Skin Breakdown?   No          Cristopher Prevention initiated:  No   Wound Care Orders initiated:  No      [facility] nurse consulted for Pressure Injury (Stage 3,4, Unstageable, DTI, NWPT, Complex wounds)and New or Established Ostomies:  No      Primary Nurse eSignature: Electronically signed by Lynn Hollingsworth RN on 2/11/21 at 3:04 PM EST

## 2021-02-11 NOTE — PROGRESS NOTES
Report called to [facility].

## 2021-02-11 NOTE — DISCHARGE INSTR - DIET

## 2021-02-11 NOTE — DISCHARGE INSTR - COC
Continuity of Care Form    Patient Name: Lakeshia Chris   :  1928  MRN:  [id number]    Admit date:  2021  Discharge date:  21    Code Status Order: Full Code   Advance Directives:   Advance Care Flowsheet Documentation       Date/Time Healthcare Directive Type of Healthcare Directive Copy in [address] Agent's Name Healthcare Agent's Phone Number    21 1515  No, patient does not have an advance directive for healthcare treatment -- -- -- -- --            Admitting Physician:  Dilma Gonzalez MD  PCP: Shilpa Colbert    Discharging Nurse: [facility] Unit/Room#: 0227/0227-02  Discharging Unit Phone Number: [phone]    Emergency Contact:   Extended Emergency Contact Information  Primary Emergency Contact: Aarti Cook  Address: [address] [place] [address] Phone: [phone]  Mobile Phone: [phone]  Relation: Spouse  Secondary Emergency Contact: [facility] Phone: [phone]  Relation: Child   needed? No    Past Surgical History:  Past Surgical History:   Procedure Laterality Date    CATARACT REMOVAL WITH IMPLANT  11    right eye    CATARACT REMOVAL WITH IMPLANT  11    LEFT    COLONOSCOPY      LITHOTRIPSY      TONSILLECTOMY         Immunization History: There is no immunization history on file for this patient.     Active Problems:  Patient Active Problem List   Diagnosis Code    Diverticulitis of colon K57.32    KERMIT (acute kidney injury) ([facility] [address] N17.9    Chronic deep vein thrombosis (DVT) of proximal vein of lower extremity (HCC) I82.5Y9    Hypoxia R09.02    Hyperglycemia R73.9       Isolation/Infection:   Isolation            No Isolation          Patient Infection Status       Infection Onset Added Last Indicated Last Indicated By Review Planned Expiration Resolved Resolved By    None active    Resolved COVID-19 Rule Out 02/11/21 02/11/21 02/11/21 COVID-19 (Ordered)   02/11/21 Rule-Out Test Resulted    C-diff Rule Out 02/08/21 02/08/21 02/08/21 Clostridium Difficile Toxin/Antigen (Ordered)   02/08/21 Rule-Out Test Resulted    COVID-19 Rule Out 02/07/21 02/07/21 02/07/21 COVID-19 (Ordered)   02/07/21 Rule-Out Test Resulted    MRSA  11/19/12 11/19/12 Orly Lara RN   02/08/21 Fred Del Cid, RN            Nurse Assessment:  Last Vital Signs: BP (!) 152/77   Pulse 90   Temp 96.3 °F (35.7 °C) (Axillary)   Resp 18   Ht 5' 9\" (1.753 m)   Wt 214 lb (97.1 kg)   SpO2 96%   BMI 31.60 kg/m²     Last documented pain score (0-10 scale): Pain Level: 0  Last Weight:   Wt Readings from Last 1 Encounters:   02/07/21 214 lb (97.1 kg)     Mental Status:  oriented to self    IV Access:  - None    Nursing Mobility/ADLs:  Walking   Assisted  Transfer  Assisted  Bathing  Assisted  Dressing  Assisted  Toileting  Assisted  Feeding  Assisted  Med Admin  Assisted  Med Delivery   whole    Wound Care Documentation and Therapy:  Incision 01/28/11 Eye Right (Active)   Number of days: 9527       Incision 02/24/11 Eye Left (Active)   Number of days: 7124        Elimination:  Continence:   · Bowel: Yes  · Bladder: No  Urinary Catheter: None   Colostomy/Ileostomy/Ileal Conduit: No       Date of Last BM: 2/10/21    Intake/Output Summary (Last 24 hours) at 2/11/2021 1320  Last data filed at 2/11/2021 0428  Gross per 24 hour   Intake 440 ml   Output 125 ml   Net 315 ml     I/O last 3 completed shifts: In: 800 [P.O.:600; IV Piggyback:200]  Out: 125 [Urine:125]    Safety Concerns: At Risk for Falls    Impairments/Disabilities:      None    Nutrition Therapy:  Current Nutrition Therapy:   Low fiber diet    Routes of Feeding: Oral  Liquids:  Thin Liquids  Daily Fluid Restriction: no  Last Modified Barium Swallow with Video (Video Swallowing Test): not done    Treatments at the Time of Hospital Discharge:   Respiratory Treatments: none

## 2021-02-11 NOTE — PLAN OF CARE
Problem: Falls - Risk of:  Goal: Will remain free from falls  Description: Will remain free from falls  Outcome: Completed     Problem: Falls - Risk of:  Goal: Absence of physical injury  Description: Absence of physical injury  Outcome: Completed     Problem: Nutritional:  Goal: Nutritional status will improve  Description: Nutritional status will improve  Outcome: Completed     Problem: Physical Regulation:  Goal: Ability to maintain vital signs within normal range will improve  Description: Ability to maintain vital signs within normal range will improve  Outcome: Completed     Problem: Skin Integrity:  Goal: Demonstration of wound healing without infection will improve  Description: Demonstration of wound healing without infection will improve  Outcome: Completed     Problem: Skin Integrity:  Goal: Complications related to intravenous access or infusion will be avoided or minimized  Description: Complications related to intravenous access or infusion will be avoided or minimized  Outcome: Completed     Problem: Skin Integrity:  Goal: Will show no infection signs and symptoms  Description: Will show no infection signs and symptoms  Outcome: Completed     Problem: Skin Integrity:  Goal: Absence of new skin breakdown  Description: Absence of new skin breakdown  Outcome: Completed     Problem: Infection:  Goal: Will remain free from infection  Description: Will remain free from infection  Outcome: Completed     Problem: Safety:  Goal: Free from intentional harm  Description: Free from intentional harm  Outcome: Completed     Problem: Daily Care:  Goal: Daily care needs are met  Description: Daily care needs are met  Outcome: Completed     Problem: Pain:  Goal: Patient's pain/discomfort is manageable  Description: Patient's pain/discomfort is manageable  Outcome: Completed     Problem: Pain:  Goal: Pain level will decrease  Description: Pain level will decrease  Outcome: Completed     Problem: Pain: Goal: Control of acute pain  Description: Control of acute pain  Outcome: Completed     Problem: Pain:  Goal: Control of chronic pain  Description: Control of chronic pain  Outcome: Completed     Problem: Skin Integrity:  Goal: Skin integrity will stabilize  Description: Skin integrity will stabilize  Outcome: Completed     Problem: Discharge Planning:  Goal: Patients continuum of care needs are met  Description: Patients continuum of care needs are met  Outcome: Completed     Problem: Restraint Use - Nonviolent/Non-Self-Destructive Behavior:  Goal: Absence of restraint indications  Description: Absence of restraint indications  Outcome: Completed     Problem: Restraint Use - Nonviolent/Non-Self-Destructive Behavior:  Goal: Absence of restraint-related injury  Description: Absence of restraint-related injury  Outcome: Completed

## 2021-02-11 NOTE — PROGRESS NOTES
Progress Note  Date:2021       Room:[facility]7/0227-02  Patient Name:Trevon Mtz     YOB: 1928     Age:89 y.o. Subjective    Subjective:  Symptoms:  Stable. Pain:  He reports no pain. Review of Systems  Objective         Vitals Last 24 Hours:  TEMPERATURE:  Temp  Av.6 °F (36.4 °C)  Min: 97 °F (36.1 °C)  Max: 98 °F (36.7 °C)  RESPIRATIONS RANGE: Resp  Av.5  Min: 18  Max: 20  PULSE OXIMETRY RANGE: SpO2  Av.3 %  Min: 93 %  Max: 97 %  PULSE RANGE: Pulse  Av  Min: 81  Max: 106  BLOOD PRESSURE RANGE: Systolic (04DFX), XIU:038 , Min:125 , SXP:327   ; Diastolic (32NMU), KWON:50, Min:57, Max:78    I/O (24Hr): Intake/Output Summary (Last 24 hours) at 2021 1007  Last data filed at 2021 0428  Gross per 24 hour   Intake 680 ml   Output 125 ml   Net 555 ml     Objective:  General Appearance: In no acute distress and not in pain. Vital signs: (most recent): Blood pressure (!) 125/57, pulse 83, temperature 97.5 °F (36.4 °C), temperature source Axillary, resp. rate 18, height 5' 9\" (1.753 m), weight 214 lb (97.1 kg), SpO2 93 %. Abdomen: Abdomen is soft. Bowel sounds are normal.   There is no abdominal tenderness. Labs/Imaging/Diagnostics    Labs:  CBC:  Recent Labs     02/10/21  0527   WBC 11.3*   RBC 4.01*   HGB 11.4*   HCT 35.5*   MCV 88.5   RDW 15.6*        CHEMISTRIES:  Recent Labs     21  0633 02/10/21  0521  06    141 143   K 3.5 3.3* 3.4*    108 109   CO2 27 25 27   BUN 41* 24* 17   CREATININE 0.9 0.9 0.8   GLUCOSE 94 139* 109*     PT/INR:No results for input(s): PROTIME, INR in the last 72 hours. APTT:No results for input(s): APTT in the last 72 hours. LIVER PROFILE:  Recent Labs     21  0633   AST 18   ALT 11   BILIDIR <0.2   BILITOT <0.2   ALKPHOS 68       Imaging Last 24 Hours:  No results found.   Assessment//Plan           Hospital Problems           Last Modified POA Diverticulitis of colon 2/8/2021 Yes    KERMIT (acute kidney injury) ([facility] [address] 2/8/2021 Yes    Chronic deep vein thrombosis (DVT) of proximal vein of lower extremity ([facility] [address] 2/8/2021 Yes    Hypoxia 2/8/2021 Yes    Hyperglycemia 2/8/2021 Yes        Assessment & Plan  89 y. o. male w hx of DMII, Asthma, arthritis, remote hx of DVT's on Elquis, who presents with several days of diffuse abd pain, worse in upper abd, associated w N/V. He reports his abdomen has been distended for years though. CT revealed an uncomplicated acute sigmoid diverticulitis along w possible mild diffuse colitis and SI ileus w gallstones and constipation. Last colonoscopy is reportedly in 2007. Fecal Calprotectin is 524 but patient is asymptomatic (could still be infectious etiology). His Sx responded to Abx's and Miralax. Patient is currently in restraints for combativeness.     Plan:   1. Supportive care  2. Agree w Abx's and daily Miralax  3. Continue low fiber diet  4.  Will follow as outpatient w a colonoscopy in 4 weeks     Wily Estes MD       [facility] [place]) [phone]    Electronically signed by Wily Estes MD on 2/11/21 at 10:07 AM EST

## 2021-02-11 NOTE — PROGRESS NOTES
Occupational Therapy Daily Treatment Note    Unit: [address] [facility]  Date:  2/11/2021  Patient Name:    Bebeto Candelaria  Admitting diagnosis:  Colitis [K52.9]  Admit Date:  2/7/2021  Precautions/Restrictions:  Fall risk, Bed/chair alarm, Lines -IV, Confusion, [place] (hard of hearing), Telemetry and telesitter      Discharge Recommendations: SNF  DME needs for discharge: defer to facility       Therapy recommendations for staff:   Assist of 1 with use of rolling walker (RW) and gait belt for all transfers and ambulation within room    AM-PAC Score: AM-PAC Inpatient Daily Activity Raw Score: 16  Home Health S4 Level: NA       Treatment Time:  13:20-13:45  Treatment number:  3  Timed code treatment minutes: 25 minutes  Total treatment minutes:   25 minutes    History of Present Illness: The patient is a 80 y. o. male w hx of DMII, Asthma arthritis, remote hx of MRSA infection, lives at home with his wife, who presents with abd pain. Pt reports his abdomen has been distended for years, but in the past several days he developed worsening pain - diffusely - worse across upper abdomen. This was associated with nausea and an episode of emesis and very poor PO intake - he does not feel like eating at all. His wife was worried and sent him to the ED. He denies diarrhea per say. He was unable to tell me when he had a BM last time. Pt has Hx of multiple DVTs - he used to be on coumadin. It appears that he is now taking eliquis instead. He reports colonoscopy many years ago (2007) - several polyps removed then.     Subjective:  Patient supine in bed and talking with wife upon therapist arrival. Patient agreeable to treatment.      Pain   No  Rating: NA  Location:  Pain Medicine Status: No request made      Bed Mobility:   Supine to Sit:  SBA  Sit to Supine:  Not Tested  Rolling:           Not Tested  Scooting:        SBA    Transfer Training:   Sit to stand:   CGA  Stand to sit:  CGA Bed to Chair:  CGA with RW and gait belt, cues for hand placement  Bed to [facility]:   Not Tested  Standard toilet:   Not Tested    Activity Tolerance   Pt completed therapy session with No adverse symptoms noted w/activity    ADL Training:   Upper body dressing: Not Tested  Upper body bathing:  Not Tested  Lower body dressing:  Min A change Depends  Lower body bathing:  Not Tested  Toileting:   Min A  wipe periarea after stool noted in Depends   Grooming/Hygiene:  SBA clean hands with wipe after cleansing periarea    Therapeutic Exercise: all completed bilaterally unless indicated  Shoulder flex/ext:  x10, hands clasped and punching up toward ceiling  Scapular retraction:  x10   Shoulder abd/adduction: x10  Shoulder horizontal abd/add: x10    Marching in place: x10  Heel raises: x10    Patient Education:   Role of OT  Safe RW use/hand placement    Positioning Needs:   Pt up in chair, alarm set, positioned in proper neutral alignment and pressure relief provided. Call light provided and all needs within reach    Family Present:  No    Assessment: Making significant progress toward goals, motivated to engage in exercises. Continue. GOALS  To be met in 3 Visits:  1). Bed to toilet/BSC: Min A     To be met in 5 Visits:  1). Supine to/from Sit:             SBA  2). Upper Body Bathing:         SBA  3). Lower Body Bathing:         CGA  4). Upper Body Dressing:       SBA  5). Lower Body Dressing:       CGA  6).  Pt to demonstrate UE exs x 15 reps with minimal cues      Plan: cont with [facility], OTR/L #[id number]    If patient discharges from this facility prior to next visit, this note will serve as the Discharge Summary

## 2021-02-12 NOTE — DISCHARGE SUMMARY
Name:  Torito Glover  Room:  9637/1873-56  MRN:    [id number]    Discharge Summary      This discharge summary is in conjunction with a complete physical exam done on the day of discharge. Attending Physician: Dr. Dustin Mukherjee  Discharging Physician: Dr. Astrid Dent: 2/7/2021  Discharge:  2/11/2021    HPI:  The patient is a 80 y.o. male w hx of DMII, Asthma arthritis, remote hx of MRSA infection, lives at home with his wife, who presents with abd pain.      Pt reports his abdomen has been distended for years, but in the past several days he developed worsening pain - diffusely - worse across upper abdomen. This was associated with nausea and an episode of emesis and very poor PO intake - he does not feel like eating at all. His wife was worried and sent him to the ED.      He denies diarrhea per say. He was unable to tell me when he had a BM last time.      Pt has Hx of multiple DVTs - he used to be on coumadin. It appears that he is now taking eliquis instead.         He reports colonoscopy many years ago (2007) - several polyps removed then.        Diagnoses this Admission and Hospital Course   #Acute sigmoid diverticulitis and diffuse colitis seen on CT of the abdomen. He was started on IV Zosyn. Does not have much abdominal pain today. GI has been consulted. Denies diarrhea. Advanced to low fiber diet. Has had multiple BMs. D/c on Augmentin. Given Rx for Oxy-IR. OP colonoscopy if family desires.      #Acute kidney injury. Creatinine 1.5 at admission. On IV fluids. Resolved.      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 On Eliquis.     #Hypoxia. Unclear etiology. Resolved.      #Hyperglycemia. No history of diabetes. Given advanced age we will stop doing blood sugar test 4 times a day. A1C normal.      #Agitation/mild confusion. I suspect Alzheimer's dementia. Discussed with wife. May need Risperidone if he is still agitated.      D/c to SNF. Procedures (Please Review Full Report for Details)  N/A    Consults    GI      Physical Exam at Discharge:    BP (!) 152/77   Pulse 90   Temp 96.3 °F (35.7 °C) (Axillary)   Resp 18   Ht 5' 9\" (1.753 m)   Wt 214 lb (97.1 kg)   SpO2 96%   BMI 31.60 kg/m²     General appearance: alert, appears stated age and cooperative  Head: Normocephalic, without obvious abnormality, atraumatic  Eyes: conjunctivae/corneas clear. PERRL, EOM's intact. Neck: no adenopathy, no carotid bruit, no JVD, supple, symmetrical, trachea midline and thyroid not enlarged, symmetric, no tenderness/mass/nodules  Lungs: clear to auscultation bilaterally  Heart: increased rate and regular rhythm, S1, S2 normal, no murmur, click, rub or gallop  Abdomen: soft, no abdominal tenderness; bowel sounds normal; no masses,  no organomegaly  Extremities: extremities normal, atraumatic, no cyanosis or edema  Pulses: 2+ and symmetric  Skin: Skin color, texture, turgor normal. No rashes or lesions  Neurologic: Grossly normal    CBC:   Recent Labs     02/10/21  0527   WBC 11.3*   HGB 11.4*   HCT 35.5*   MCV 88.5        BMP:   Recent Labs     02/10/21  0527 02/11/21  0628    143   K 3.3* 3.4*    109   CO2 25 27   BUN 24* 17   CREATININE 0.9 0.8       U/A:    Lab Results   Component Value Date    COLORU Yellow 02/07/2021    WBCUA None seen 02/07/2021    RBCUA 11-20 02/07/2021    MUCUS Rare 02/07/2021    BACTERIA 1+ 02/07/2021    CLARITYU Clear 02/07/2021    SPECGRAV 1.015 02/07/2021    LEUKOCYTESUR Negative 02/07/2021    BLOODU TRACE-INTACT 02/07/2021    GLUCOSEU Negative 02/07/2021       CULTURES  Results for Syed Booker (MRN [id number]) as of 2/8/2021 08:37    Ref.  Range 2/7/2021 12:16   SARS-CoV-2, NAAT Latest Ref Range: Not Detected  Not Detected      Blood: NGTD  GI PCR: negative  C-diff: negative    RADIOLOGY  CT CHEST ABDOMEN PELVIS W CONTRAST   Final Result 1.  Mild acute sigmoid diverticulitis without gross perforation, abscess or   obstruction. 2.  Findings suggestive of diffuse colitis. 3.  Diffuse mild small bowel distension consistent with ileus. 4.  No acute airspace disease identified. 5.  Cholelithiasis without evidence for acute cholecystitis or biliary   dilatation. 6.  Bilateral nephrolithiasis. 7.  Suspect small bladder stone versus adjacent prostate calcification. XR CHEST PORTABLE   Final Result   Mild bibasilar airspace disease, representing either atelectasis or pneumonia. Discharge Medications     Medication List      START taking these medications    amoxicillin-clavulanate 875-125 MG per tablet  Commonly known as: Augmentin  Take 1 tablet by mouth 3 times daily for 7 days  Notes to patient: Augmentin  Use: treat bacterial infections  Side effects:rash; hives; itching; shortness of breath; wheezing; cough     oxyCODONE 5 MG immediate release tablet  Commonly known as: Roxicodone  Take 0.5 tablets by mouth every 6 hours as needed for Pain for up to 3 days. Intended supply: 3 days. Take lowest dose possible to manage pain  Notes to patient: Oxycontin(Oxycodone)  Use:  pain    Side effects: sleepiness, constipation     promethazine 12.5 MG tablet  Commonly known as: PHENERGAN  Take 1 tablet by mouth every 6 hours as needed for Nausea  Notes to patient: Promethazine (Phenergan®)  Use: nausea and vomiting. Side effects: drowsiness, lightheadedness, blurry vision,      or dry mouth.      risperiDONE 0.25 MG tablet  Commonly known as: RISPERDAL  Take 1 tablet by mouth 2 times daily as needed (agitation)        CONTINUE taking these medications    COMBIGAN OP     Eliquis 5 MG Tabs tablet  Generic drug: apixaban     ferrous sulfate 325 (65 Fe) MG tablet  Commonly known as: IRON 325     latanoprost 0.005 % ophthalmic solution  Commonly known as: XALATAN        STOP taking these medications diphenhydrAMINE 25 MG tablet  Commonly known as: BENADRYL     HYDROcodone-acetaminophen 5-325 MG per tablet  Commonly known as: [address] [place]           Where to Get Your Medications      You can get these medications from any pharmacy    Bring a paper prescription for each of these medications  · oxyCODONE 5 MG immediate release tablet     Information about where to get these medications is not yet available    Ask your nurse or doctor about these medications  · amoxicillin-clavulanate 875-125 MG per tablet  · promethazine 12.5 MG tablet  · risperiDONE 0.25 MG tablet           Discharged in stable condition to SNF. Follow Up: Follow up with PCP. I personally talked to his wife.      More than 30 mts spent      Luc Betancourt 2/12/2021 3:28 PM

## 2021-03-22 PROBLEM — K57.20 DIVERTICULITIS OF LARGE INTESTINE WITH ABSCESS: Status: ACTIVE | Noted: 2021-01-01

## 2021-03-22 PROBLEM — R10.84 GENERALIZED ABDOMINAL PAIN: Status: ACTIVE | Noted: 2021-01-01

## 2021-03-22 PROBLEM — K56.609 INTESTINAL OBSTRUCTION (HCC): Status: ACTIVE | Noted: 2021-01-01

## 2021-03-22 PROBLEM — E87.20 LACTIC ACIDOSIS: Status: ACTIVE | Noted: 2021-01-01

## 2021-03-22 NOTE — ED NOTES
Dr. Jalil Wilkins returned page at [address] and spoke to Dr. Vani Neal.       Ze Devine  36/17/02 0016

## 2021-03-22 NOTE — ED NOTES
Hand-off report given to BERNABE Gallo. Patient transported to floor via wheelchair, with nurse. Family accompanied patient to floor.      Brayden Judge RN  03/22/21 0405

## 2021-03-22 NOTE — ED NOTES
Spiritual Care called per patient and patient family request.     Patrick Martinez RN  03/22/21 1817

## 2021-03-22 NOTE — CARE COORDINATION
Received referral to Case Management for Hospice.  Notified Palliative care RN who stated she would review

## 2021-03-22 NOTE — ED PROVIDER NOTES
Emergency Department Encounter  Location: [place] [address] ED    Patient: Loan Lockwood  MRN: [id number]  : 1928  Date of evaluation: 3/21/2021  ED Provider: Huma Encinas MD    Loan Lockwood was checked out to me by Dr. Jacqueline Bey. Please see his/her initial documentation for details of the patient's initial ED presentation, physical exam and completed studies. In brief, Loan Lockwood is a 80 y.o. male that presented to the emergency department abdominal pain and distention. He was signed out to me pending recommendation from general surgery. General surgery evaluated patient had a discussion with family. They came to conclusion that this was not a surgical case and that patient should be admitted for comfort care. Hospitalist has been consulted pending the admission.     I have reviewed and interpreted all of the currently available lab results and diagnostics from this visit:  Results for orders placed or performed during the hospital encounter of 21   COVID-19, Rapid    Specimen: Nasopharyngeal Swab   Result Value Ref Range    SARS-CoV-2, NAAT Not Detected Not Detected   CBC auto differential   Result Value Ref Range    WBC 11.3 (H) 4.0 - 11.0 K/uL    RBC 4.37 4.20 - 5.90 M/uL    Hemoglobin 12.4 (L) 13.5 - 17.5 g/dL    Hematocrit 38.3 (L) 40.5 - 52.5 %    MCV 87.6 80.0 - 100.0 fL    MCH 28.5 26.0 - 34.0 pg    MCHC 32.5 31.0 - 36.0 g/dL    RDW 15.5 (H) 12.4 - 15.4 %    Platelets 522 289 - 425 K/uL    MPV 7.2 5.0 - 10.5 fL    PLATELET SLIDE REVIEW Adequate     SLIDE REVIEW see below     Path Consult Yes     Neutrophils % 59.0 %    Lymphocytes % 9.0 %    Monocytes % 17.0 %    Eosinophils % 0.0 %    Basophils % 0.0 %    Neutrophils Absolute 8.4 (H) 1.7 - 7.7 K/uL    Lymphocytes Absolute 1.0 1.0 - 5.1 K/uL    Monocytes Absolute 1.9 (H) 0.0 - 1.3 K/uL    Eosinophils Absolute 0.0 0.0 - 0.6 K/uL    Basophils Absolute 0.0 0.0 - 0.2 K/uL    Bands Relative 15 (H) 0 - 7 %    Anisocytosis Occasional (A)     Polychromasia Occasional (A)     Ovalocytes Occasional (A)    Comprehensive metabolic panel   Result Value Ref Range    Sodium 134 (L) 136 - 145 mmol/L    Potassium 4.3 3.5 - 5.1 mmol/L    Chloride 97 (L) 99 - 110 mmol/L    CO2 24 21 - 32 mmol/L    Anion Gap 13 3 - 16    Glucose 166 (H) 70 - 99 mg/dL    BUN 41 (H) 7 - 20 mg/dL    CREATININE 1.7 (H) 0.8 - 1.3 mg/dL    GFR Non- 38 (A) >60    GFR  46 (A) >60    Calcium 10.5 8.3 - 10.6 mg/dL    Total Protein 8.0 6.4 - 8.2 g/dL    Albumin 3.3 (L) 3.4 - 5.0 g/dL    Albumin/Globulin Ratio 0.7 (L) 1.1 - 2.2    Total Bilirubin 0.4 0.0 - 1.0 mg/dL    Alkaline Phosphatase 79 40 - 129 U/L    ALT 8 (L) 10 - 40 U/L    AST 12 (L) 15 - 37 U/L    Globulin 4.7 g/dL   Troponin   Result Value Ref Range    Troponin 0.02 (H) <0.01 ng/mL   Lipase   Result Value Ref Range    Lipase 11.0 (L) 13.0 - 60.0 U/L   Urine, reflex to culture    Specimen: Urine, clean catch   Result Value Ref Range    Color, UA Yellow Straw/Yellow    Clarity, UA Clear Clear    Glucose, Ur Negative Negative mg/dL    Bilirubin Urine Negative Negative    Ketones, Urine 15 (A) Negative mg/dL    Specific Gravity, UA 1.025 1.005 - 1.030    Blood, Urine TRACE-INTACT (A) Negative    pH, UA 5.0 5.0 - 8.0    Protein, UA TRACE (A) Negative mg/dL    Urobilinogen, Urine 0.2 <2.0 E.U./dL    Nitrite, Urine Negative Negative    Leukocyte Esterase, Urine Negative Negative    Microscopic Examination YES     Urine Type NotGiven     Urine Reflex to Culture Not Indicated    Lactate, Sepsis   Result Value Ref Range    Lactic Acid, Sepsis 4.4 (HH) 0.4 - 1.9 mmol/L   Lactate, Sepsis   Result Value Ref Range    Lactic Acid, Sepsis 3.5 (H) 0.4 - 1.9 mmol/L   Microscopic Urinalysis   Result Value Ref Range    Hyaline Casts, UA 3-5 (A) 0 - 2 /LPF    Mucus, UA 1+ (A) None Seen /LPF    WBC, UA 0-2 0 - 5 /HPF    RBC, UA 5-10 (A) 0 - 4 /HPF    Bacteria, UA Rare (A) None Seen /HPF    Amorphous, UA 2+ /HPF    Crystals, UA Few Ca. Oxalate (A) None Seen /HPF   EKG 12 Lead   Result Value Ref Range    Ventricular Rate 114 BPM    Atrial Rate 114 BPM    P-R Interval 148 ms    QRS Duration 98 ms    Q-T Interval 326 ms    QTc Calculation (Bazett) 449 ms    P Axis 55 degrees    R Axis -62 degrees    T Axis 87 degrees    Diagnosis       Sinus tachycardia with occasional Premature ventricular complexesLeft anterior fascicular blockAbnormal ECGWhen compared with ECG of 10-FEB-2021 08:29,Left anterior fascicular block is now Present     Ct Abdomen Pelvis Wo Contrast Additional Contrast? None    Result Date: 3/21/2021  EXAMINATION: CT OF THE ABDOMEN AND PELVIS WITHOUT CONTRAST 3/21/2021 10:01 pm TECHNIQUE: CT of the abdomen and pelvis was performed without the administration of intravenous contrast. Multiplanar reformatted images are provided for review. Dose modulation, iterative reconstruction, and/or weight based adjustment of the mA/kV was utilized to reduce the radiation dose to as low as reasonably achievable. COMPARISON: CT of the abdomen pelvis 02/07/2021 HISTORY: ORDERING SYSTEM PROVIDED HISTORY: diarrhea, protuberant abdomen TECHNOLOGIST PROVIDED HISTORY: Reason for exam:->diarrhea, protuberant abdomen Additional Contrast?->None Reason for Exam: abdo pain, diarrhea Acuity: Acute Type of Exam: Initial Additional signs and symptoms: abdo distention FINDINGS: Lower Chest: bibasilar atelectasis. Heart is mildly prominent size. Organs: Evaluation challenge by lack of contrast.  Cholelithiasis. Gallbladder is mildly distended 4.3 cm. Please correlate symptomatology if indicated, ultrasound may be considered. Otherwise the liver, spleen, adrenal glands, and pancreas unremarkable. Bilateral renal calculi nonobstructive. Left-sided peripelvic the bilateral peripelvic cysts. No perinephric fluid collections. GI/Bowel: There are dilated loops of bowel with prominent air-fluid levels again identified.   There findings suspicious for gas identified along the wall the colon worrisome for underlying pneumatosis. Prominent loops of bowel identified worrisome for underlying bowel obstruction. There is a segment of sigmoid colon with circumferential wall thickening and wall and colonic diverticulosis. Appears decompressed similar prior exam.  Unclear if this is causing colonic outflow obstruction given its stability, underlying neoplasm could be considered. There adjacent lymph nodes identified along this bowel loop. There are indeterminate. Questionable fistulous communication between the loop of sec sigmoid colon to a loculated collection within the abdomen pelvis. Questionable abscess or versus contained perforated diverticulitis 2.4 x 1.8 cm Pelvis: Prostate is enlarged. This indents the base the bladder. There is stone within dependent portion of bladder. There is mild wall thickening of the bladder noted. Near the dome of the bladder there is gas noted, unclear if this is intramural or within the bladder lumen. . There may be subtle diverticulum or tract extending from this dome of the bladder unclear if this communicates to a bowel loop. Peritoneum/Retroperitoneum: Aortic vascular calcifications. No free air. No free fluid. Bones/Soft Tissues: Degenerate changes of the thoracic and lumbar spine. Dilated loops of small and large bowel. This may be due to nonspecific ileus. However there is a segment sigmoid colon with wall thickening and underdistention. This could be due to chronic inflammatory process or chronic diverticulitis. There is associated small contained abscess near this bowel loop. Additionally there may be questionable system fistulous tract extending from the visualize cavity similar other additional bowel loops.   Contained perforated diverticulitis may also be considered in differential. Small loculated collection near the dome of the bladder may represent and gas within the lumen of bladder versus gas within the bladder wall with questionable fistulous tract extending to a bowel loop. Findings worrisome for pneumatosis involving some of the dilated loops of bowel. Cholelithiasis with dilated gallbladder. Enteric contrast oral or rectal route could be beneficial to evaluate these bowel loops and and questionable fistulous tracks. Ct Head Wo Contrast    Result Date: 3/21/2021  EXAMINATION: CT OF THE HEAD WITHOUT CONTRAST  3/21/2021 10:09 pm TECHNIQUE: CT of the head was performed without the administration of intravenous contrast. Dose modulation, iterative reconstruction, and/or weight based adjustment of the mA/kV was utilized to reduce the radiation dose to as low as reasonably achievable. COMPARISON: None. HISTORY: ORDERING SYSTEM PROVIDED HISTORY: weakness, malaise TECHNOLOGIST PROVIDED HISTORY: Reason for exam:->weakness, malaise Has a \"code stroke\" or \"stroke alert\" been called? ->No Decision Support Exception->Emergency Medical Condition (MA) Reason for Exam: weakness, abdo pain Acuity: Unknown Type of Exam: Unknown FINDINGS: There is no acute infarction, intracranial hemorrhage or intracranial mass lesion. No mass effect, midline shift or extra-axial collection is noted. There are moderate nonspecific foci of periventricular and subcortical cerebral white matter hypodensity, most likely representing chronic microangiopathic disease in this age group. The brain parenchyma is otherwise normal. The cerebellar tonsils are in normal position. The ventricles, sulci, and cisterns are mildly prominent suggestive of moderate generalized volume loss. Bilateral lens implants. The globes and orbits are within normal limits. The visualized extracranial structures including paranasal sinuses and mastoid air cells are unremarkable. No fracture is identified. No evidence for acute intracranial hemorrhage, territorial infarction or intracranial mass lesion. Moderate chronic microangiopathic ischemic disease. Moderate generalized volume loss. Xr Chest Portable    Result Date: 3/21/2021  EXAMINATION: ONE XRAY VIEW OF THE CHEST 3/21/2021 9:13 pm COMPARISON: CT February 7, 2021 HISTORY: ORDERING SYSTEM PROVIDED HISTORY: Chest pain TECHNOLOGIST PROVIDED HISTORY: Reason for exam:->cp Reason for Exam: abdo pain/chest pain Acuity: Unknown Type of Exam: Unknown Additional signs and symptoms: abdo distention FINDINGS: Moderate cardiomegaly not appreciably changed. Mild left basilar atelectasis or scarring. Lungs otherwise clear. No pleural effusion, pneumothorax or subdiaphragmatic free air appreciated. No acute osseous abnormality identified. Mild left basilar atelectasis or scarring. Otherwise, no acute abnormality identified. Moderate cardiomegaly not appreciably changed. ED Medication Orders (From admission, onward)    Start Ordered     Status Ordering Provider    03/22/21 0330 03/22/21 0326  fentaNYL (SUBLIMAZE) injection 50 mcg  ONCE      Acknowledged TOMER, NSEHNIITOOH A    03/21/21 2245 03/21/21 2242  lactated ringers bolus  ONCE      Last MAR action: Stopped [facility]    03/21/21 2245 03/21/21 2242  piperacillin-tazobactam (ZOSYN) 4,500 mg in sodium chloride 0.9 % 100 mL IVPB (mini-bag)  ONCE     Question:  Antimicrobial Indications  Answer:  Sepsis of Unknown Etiology    Last MAR action: Stopped - by Melisa Nazia on 03/21/21 at [address]    03/21/21 2245 03/21/21 2242  vancomycin (VANCOCIN) 2,000 mg in dextrose 5 % 500 mL IVPB  ONCE     Question:  Antimicrobial Indications  Answer:  Sepsis of Unknown Etiology    Last MAR action: Stopped - by Melisa Nazia on 03/22/21 at [address]    03/21/21 2145 03/21/21 2141  ondansetron (ZOFRAN) injection 4 mg  ONCE      Last MAR action: Given - by Nhan Rubio on 03/21/21 at [address]          Final Impression      1.  Diverticulitis of large intestine with abscess, unspecified bleeding status    2. Intestinal obstruction, unspecified cause, unspecified whether partial or complete ([facility] [address]    3.  KERMIT (acute kidney injury) [facility])        DISPOSITION Decision To Admit 03/21/2021 11:18:08 PM     (Please note that portions of this note may have been completed with a voice recognition program. Efforts were made to edit the dictations but occasionally words are mis-transcribed.)    MD Holli Brynat MD  03/22/21 7193

## 2021-03-22 NOTE — ED NOTES
Patient becoming restless. Pulling off equipment and attempting to get out of bed. Patient wife has patient's belongings expect for his denture, hat and glasses. Patient ambulates with a cane but wife has and patient is too weak.      Cuong Daniels RN  03/22/21 5216

## 2021-03-22 NOTE — H&P
Hospital Medicine History & Physical      PCP: Florecita Roche MD    Date of Admission: 3/21/2021    Date of Service: Pt seen/examined on 3/22/2021     Chief Complaint:    Chief Complaint   Patient presents with    Abdominal Pain     pt in with c/o abd pain and CP for the past 3-4 days. Pt has not been eating or drinking like he normally does. An episode of diarrhea this morning. pt also states his abd is a bit more distended than normal.hx of diverticulitis. History Of Present Illness: The patient is a 80 y.o. 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 The patient is mildly confused at time of my examination, supplemental history is obtained from patient's wife and ER record. Patient has been experiencing abdominal pain for 3 to 4 days prior to coming to the ER. He had a decreased appetite with little oral intake for the past 3 to 4 days. His abdomen felt distended. He had one episode of diarrhea. He was admitted in February 2021 for acute diverticulitis and discharged to a skilled nursing facility for acute rehab. He recently returned home with his wife, initially after returning home he was doing well but seem to have taken a turn for the worse over the last few days. Past Medical History:        Diagnosis Date    Arthritis     Asthma     Diverticulitis     Glaucoma     Knee pain     MRSA (methicillin resistant staph aureus) culture positive 11/15/2012    abscess left forearm       Past Surgical History:        Procedure Laterality Date    CATARACT REMOVAL WITH IMPLANT  1/28/11    right eye    CATARACT REMOVAL WITH IMPLANT  2/24/11    LEFT    COLONOSCOPY      LITHOTRIPSY      TONSILLECTOMY         Medications Prior to Admission:    Prior to Admission medications    Medication Sig Start Date End Date Taking?  Authorizing Provider   calcium carbonate (OSCAL) 500 MG TABS tablet Take 500 mg by mouth daily   Yes Historical Provider, MD   diclofenac (VOLTAREN) 0.1 % ophthalmic solution 1 drop 4 times daily   Yes Historical Provider, MD   fluticasone (FLONASE) 50 MCG/ACT nasal spray 1 spray by Each Nostril route daily   Yes Historical Provider, MD   HYDROcodone-acetaminophen (NORCO) 5-325 MG per tablet Take 1 tablet by mouth every 6 hours as needed for Pain. Yes Historical Provider, MD   Multiple Vitamins-Minerals (THERAPEUTIC MULTIVITAMIN-MINERALS) tablet Take 1 tablet by mouth daily   Yes Historical Provider, MD   potassium chloride (KLOR-CON M) 10 MEQ extended release tablet Take 10 mEq by mouth daily   Yes Historical Provider, MD   vitamin B-12 (CYANOCOBALAMIN) 100 MCG tablet Take 50 mcg by mouth daily   Yes Historical Provider, MD   apixaban (ELIQUIS) 5 MG TABS tablet Take 5 mg by mouth 2 times daily   Yes Historical Provider, MD   ferrous sulfate (IRON 325) 325 (65 Fe) MG tablet Take 325 mg by mouth daily (with breakfast)   Yes Historical Provider, MD   Brimonidine Tartrate-Timolol (COMBIGAN OP) Apply 1 drop to eye 2 times daily    Yes Historical Provider, MD       Allergies:  Patient has no known allergies. Social History:  The patient currently lives at home with his wife     TOBACCO:   reports that he has never smoked. He has never used smokeless tobacco.  ETOH:   reports no history of alcohol use. Family History:   Positive as follows:    History reviewed. No pertinent family history. REVIEW OF SYSTEMS:     Positive for abdominal pain. Unable to complete an extensive review of systems secondary to patient's mentation    PHYSICAL EXAM:    BP (!) 147/80   Pulse 96   Temp 97.7 °F (36.5 °C) (Oral)   Resp (!) 44   Ht 5' 9\" (1.753 m)   Wt 214 lb (97.1 kg)   SpO2 95%   BMI 31.60 kg/m²     Gen: Elderly  male who is awake and alert lying in bed. He appears to be in pain and in mild distress. Eyes: PERRL. No sclera icterus.  No conjunctival injection. ENT: No discharge. Pharynx clear. Neck: No JVD. Trachea midline. Resp: No accessory muscle use. No crackles. No wheezes. No rhonchi. Diminished breath sounds  CV: Regular rate. Regular rhythm. No murmur. No rub. Trace bilateral lower extremity edema. GI: Diffusely tender. Significantly distended. +bowel sounds. .   Skin: Warm and dry. No nodule on exposed extremities. No rash on exposed extremities. M/S: No cyanosis. No joint deformity. No clubbing. Neuro: Awake. Grossly nonfocal    Psych: Oriented to person. He could not state where he was initially, then listed his home address. No anxiety or agitation. CBC:   Recent Labs     03/21/21 2126 03/22/21  0758   WBC 11.3* 9.4   HGB 12.4* 11.5*   HCT 38.3* 35.3*   MCV 87.6 87.1    389     BMP:   Recent Labs     03/21/21 2126 03/22/21  0758   * 134*   K 4.3 4.5   CL 97* 99   CO2 24 23   BUN 41* 61*   CREATININE 1.7* 1.7*     LIVER PROFILE:   Recent Labs     03/21/21 2126 03/22/21  0758   AST 12* 13*   ALT 8* 9*   LIPASE 11.0*  --    BILITOT 0.4 0.4   ALKPHOS 79 70     PT/INR: No results for input(s): PROTIME, INR in the last 72 hours. APTT: No results for input(s): APTT in the last 72 hours.   UA:  Recent Labs     03/22/21  0059   COLORU Yellow   PHUR 5.0   WBCUA 0-2   RBCUA 5-10*   MUCUS 1+*   BACTERIA Rare*   CLARITYU Clear   SPECGRAV 1.025   LEUKOCYTESUR Negative   UROBILINOGEN 0.2   BILIRUBINUR Negative   BLOODU TRACE-INTACT*   GLUCOSEU Negative   AMORPHOUS 2+          CARDIAC ENZYMES  Recent Labs     03/21/21 2126 03/22/21  0758   TROPONINI 0.02* 0.02*       U/A:    Lab Results   Component Value Date    COLORU Yellow 03/22/2021    WBCUA 0-2 03/22/2021    RBCUA 5-10 03/22/2021    MUCUS 1+ 03/22/2021    BACTERIA Rare 03/22/2021    CLARITYU Clear 03/22/2021    SPECGRAV 1.025 03/22/2021    LEUKOCYTESUR Negative 03/22/2021    BLOODU TRACE-INTACT 03/22/2021    GLUCOSEU Negative 03/22/2021    AMORPHOUS 2+ 03/22/2021       ABG No results found for: KLW8OWN, BEART, X4UVMSJE, PHART, THGBART, WFT2WZD, PO2ART, ERJ1SWO    CULTURES  Blood: pending  Covid 19, NAAT: not detected     EKG:  I have reviewed the EKG with the following interpretation:   Sinus tachycardia, PVCs, LAFB    RADIOLOGY  XR CHEST PORTABLE   Final Result   1. New patchy opacity at the right lung base could represent atelectasis,   pneumonia or aspiration in the correct clinical setting. 2. Opacity at the left lung base has decreased and likely represents   resolving atelectasis with a component of scar. CT HEAD WO CONTRAST   Final Result      No evidence for acute intracranial hemorrhage, territorial infarction or   intracranial mass lesion. Moderate chronic microangiopathic ischemic disease. Moderate generalized volume loss. CT ABDOMEN PELVIS WO CONTRAST Additional Contrast? None   Final Result   Dilated loops of small and large bowel. This may be due to nonspecific   ileus. However there is a segment sigmoid colon with wall thickening and   underdistention. This could be due to chronic inflammatory process or   chronic diverticulitis. There is associated small contained abscess near   this bowel loop. Additionally there may be questionable system fistulous   tract extending from the visualize cavity similar other additional bowel   loops. Contained perforated diverticulitis may also be considered in   differential.      Small loculated collection near the dome of the bladder may represent and gas   within the lumen of bladder versus gas within the bladder wall with   questionable fistulous tract extending to a bowel loop. Findings worrisome for pneumatosis involving some of the dilated loops of   bowel. Cholelithiasis with dilated gallbladder. Enteric contrast oral or rectal route could be beneficial to evaluate these   bowel loops and and questionable fistulous tracks.          XR CHEST PORTABLE   Final Result   Mild left basilar

## 2021-03-22 NOTE — ED NOTES
Bed: 15  Expected date:   Expected time:   Means of arrival:   Comments:     Kathy Washington RN  03/22/21 5331

## 2021-03-22 NOTE — CARE COORDINATION
Case Management Assessment  Initial Evaluation      Patient Name: Mukul Gray  YOB: 1928  Diagnosis: Intestinal obstruction [facility]) [H05.020]  Date / Time: 3/21/2021  8:56 PM    Admission status/Date:inpt  Chart Reviewed: Yes      Patient Interviewed: No   Family Interviewed:  Yes - spouse      Hospitalization in the last 30 days:  No      Health Care Decision Maker :     (CM - must 1st enter selection under Navigator - emergency contact- Health Care Decision Maker Relationship and pick relationship)   Who do you trust or have selected to make healthcare decisions for you      Met with: pt's spouse at bedside  Interview conducted  (bedside/phone):    Current PCP:   Tank Mojica MD      Financial  Commercial  Precert required for SNF : Y, N          3 night stay required - Y, N    ADLS  Support Systems/Care Needs: Spouse/Significant Other, [employer]  Transportation: family    Meal Preparation: spouse    Housing  Living Arrangements: home with spouse  Steps: 2-3  Intent for return to present living arrangements: [facility]  Identified Issues:     [address] with [address] : No Agency:(Services)  Type of Home Care Services: Hospice  Passport/Waiver : No  :                      Phone Number:    Passport/Waiver Services: no          Durable Medical Equiptment   DME Provider: kirstin  Equipment:   Walker__x_Cane__x_RTS___ BSC___Shower Chair___Hospital Bed___W/C____Other________  02 at ____Liter(s)---wears(frequency)_______ HHN ___ CPAP___ BiPap___   N/A____      Home O2 Use :  No    If No for home O2---if presently on O2 during hospitalization:  Yes  if yes CM to follow for potential DC O2 need  Informed of need for care provider to bring portable home O2 tank on day of discharge for nursing to connect prior to leaving:   No  Verbalized agreement/Understanding:   Not Indicated    Community Service Affiliation  Dialysis:  No · Agency:  · Location:  · Dialysis Schedule:  · Phone:   · Fax: Other Community Services: (ex:PT/OT,Mental Health,Wound Clinic, Cardio/Pul [address])    DISCHARGE PLAN: Explained Case Management role/services. Reviewed chart. Pt active with [facility] for SN,PT/OT. Pt from home with spouse and spouse chose to meet with [facility] tomorrow. See Palliative Care notes.  following

## 2021-03-22 NOTE — PROGRESS NOTES
Patient admitted to room 232 from ER. Patient oriented to room, call light, bed rails, phone, lights and bathroom. Patient instructed about the schedule of the day including: vital sign frequency, lab draws, possible tests, frequency of MD and staff rounds, daily weights, I &O's and prescribed diet. Bed alarm on. Bed locked, in lowest position, side rails up 2/4, call light within reach. Recliner Assessment:     Patient is not able to demonstrate the ability to move from a reclining position to an upright position within the recliner due to weakness. .         4 Eyes Skin Assessment:     The patient is being assess for   Admission    I agree that 2 RN's have performed a thorough Head to Toe Skin Assessment on the patient. ALL assessment sites listed below have been assessed. Areas assessed by both nurses:   [x]   Head, Face, and Ears   [x]   Shoulders, Back, and Chest, Abdomen  [x]   Arms, Elbows, and Hands   [x]   Coccyx, Sacrum, and Ischium  [x]   Legs, Feet, and Heels        Two small stage II pressure injuries noted to coccyx with blanchable erythema noted surrounding both areas. Scattered bruising and scabs noted. **SHARE this note so that the co-signing nurse is able to place an eSignature**    Co-signer eSignature: Electronically signed by Asmita Landin RN on 2/72/71 at 5:26 PM EDT    Does the Patient have Skin Breakdown?   No          Cristopher Prevention initiated:  Yes   Wound Care Orders initiated:  NA      [facility] nurse consulted for Pressure Injury (Stage 3,4, Unstageable, DTI, NWPT, Complex wounds)and New or Established Ostomies:  NA      Primary Nurse eSignature: Electronically signed by Blanca Madrigal RN on 3/22/21 at [facility] EDT

## 2021-03-22 NOTE — FLOWSHEET NOTE
Met with Pt and wife in ED. Escorted wife to [facility] when Pt transported to floor. Listened and offered emotional support. Wife shared about importance of their erickson, Pt asleep. \"I know when he dies, he'll be with God. \" Shared about Pt's life. Prayed with wife for their care and support. Will continue to follow for support. [facility]       03/22/21 5662   Encounter Summary   Services provided to: Patient and family together   Support System Spouse; Family members; [employer]/erickson community;Palliative Care   Place of [address] [place] [address]   Continue Visiting   (3/22 initial, pall care support, prayer, prob EOL)   Complexity of Encounter Moderate   Length of Encounter 45 minutes   Spiritual Assessment Completed Yes   Spiritual/Mu-ism   Type Spiritual support   Assessment Calm; Approachable; Anxious; Coping   Intervention Active listening;Explored feelings, thoughts, concerns;Prayer;Sustaining presence/ Ministry of presence; Discussed death;Discussed afterlife; Discussed relationship with God;Discussed belief system/Mandaeism practices/erickson;Discussed illness/injury and it's impact   Outcome Comfort;Expressed gratitude;Engaged in conversation;Expressed feelings/needs/concerns; Shared reminiscences; Receptive

## 2021-03-22 NOTE — CONSULTS
Pharmacy Note  Vancomycin Consult    Zelda Wallace is a 80 y.o. male started on Vancomycin for gram positive coverage of intra-abdominal infection; consult received from Dr. Brinton Dakins to manage therapy. Also receiving the following antibiotics: Zosyn. Patient Active Problem List   Diagnosis    Diverticulitis of colon    KERMIT (acute kidney injury) ([facility] [address]    Chronic deep vein thrombosis (DVT) of proximal vein of lower extremity (HCC)    Hypoxia    Hyperglycemia    Intestinal obstruction (HCC)     Allergies:  Patient has no known allergies. Temp max: 97.7    Recent Labs     03/21/21  2126   BUN 41*   CREATININE 1.7*   WBC 11.3*     No intake or output data in the 24 hours ending 03/22/21 0615  Culture Date      Source                       Results      Ht Readings from Last 1 Encounters:   03/21/21 5' 9\" (1.753 m)        Wt Readings from Last 1 Encounters:   03/21/21 214 lb (97.1 kg)       Body mass index is 31.6 kg/m². Estimated Creatinine Clearance: 32 mL/min (A) (based on SCr of 1.7 mg/dL (H)). Goal Trough Level: 15-19 mcg/mL    Assessment/Plan:  Will initiate Vancomycin with a one time loading dose of 2000 mg x1 (given 3/21 @ 9537), followed by pulse-dosing of subsequent doses. A random vancomycin level has been ordered for tonight @ 2100. Re-dose if level is less than 17 mcg/ml. Thank you for the consult. Will continue to follow.

## 2021-03-22 NOTE — ED PROVIDER NOTES
[address] ED  EMERGENCY DEPARTMENT ENCOUNTER        Pt Name: Zelda Wallace  MRN: [id number]  [place] 6/8/1928  Date of evaluation: 3/21/2021  Provider: Swati Webber MD  PCP: Susi Page MD  ED Attending: Swati Webber MD    [address] [place] [address]       Chief Complaint   Patient presents with    Abdominal Pain     pt in with c/o abd pain and CP for the past 3-4 days. Pt has not been eating or drinking like he normally does. An episode of diarrhea this morning. pt also states his abd is a bit more distended than normal.hx of diverticulitis. HISTORY OF PRESENT ILLNESS   (Location/Symptom, Timing/Onset, Context/Setting, Quality, Duration, Modifying Factors, Severity)  Note limiting factors. Zelda Wallace is a 80 y.o. male who presents with not eating or drinking much for the last 3 or 4 days. Has been complaining of some diffuse abdominal pain along with some chest pain to his wife. Patient states he does not have any symptoms right now. Wife was concerned because he recently got out of rehab and initially was doing well. He seems to have taken a turn for the worse the last few days. Pain is mild diffuse cramping without exacerbating/alleviating factors, and without radiation. History is obtained from the patient and his wife. REVIEW OF SYSTEMS    (2-9 systems for level 4, 10 or more for level 5)     Review of Systems   Constitutional: Positive for appetite change and fatigue. Negative for chills and fever. HENT: Negative for congestion and sore throat. Eyes: Negative for discharge. Respiratory: Negative for cough. Cardiovascular: Negative for chest pain. Gastrointestinal: Positive for abdominal pain and diarrhea. Negative for nausea and vomiting. Endocrine: Negative for polydipsia. Genitourinary: Negative for dysuria and urgency. Musculoskeletal: Negative for back pain and myalgias. Skin: Negative for rash.    Neurological: Negative for weakness and headaches. Hematological: Does not bruise/bleed easily. Psychiatric/Behavioral: Negative for confusion. Positives and Pertinent negatives as per HPI. Except as noted above in the ROS, all other systems were reviewed and negative. PAST MEDICAL HISTORY     Past Medical History:   Diagnosis Date    Arthritis     Asthma     Diverticulitis     Glaucoma     Knee pain     MRSA (methicillin resistant staph aureus) culture positive 11/15/2012    abscess left forearm         SURGICAL HISTORY     Past Surgical History:   Procedure Laterality Date    CATARACT REMOVAL WITH IMPLANT  1/28/11    right eye    CATARACT REMOVAL WITH IMPLANT  2/24/11    LEFT    COLONOSCOPY      LITHOTRIPSY      TONSILLECTOMY           CURRENTMEDICATIONS       Previous Medications    APIXABAN (ELIQUIS) 5 MG TABS TABLET    Take 5 mg by mouth 2 times daily    BRIMONIDINE TARTRATE-TIMOLOL (COMBIGAN OP)    Apply 1 drop to eye 2 times daily     CALCIUM CARBONATE (OSCAL) 500 MG TABS TABLET    Take 500 mg by mouth daily    DICLOFENAC (VOLTAREN) 0.1 % OPHTHALMIC SOLUTION    1 drop 4 times daily    FERROUS SULFATE (IRON 325) 325 (65 FE) MG TABLET    Take 325 mg by mouth daily (with breakfast)    FLUTICASONE (FLONASE) 50 MCG/ACT NASAL SPRAY    1 spray by Each Nostril route daily    HYDROCODONE-ACETAMINOPHEN (NORCO) 5-325 MG PER TABLET    Take 1 tablet by mouth every 6 hours as needed for Pain. MULTIPLE VITAMINS-MINERALS (THERAPEUTIC MULTIVITAMIN-MINERALS) TABLET    Take 1 tablet by mouth daily    POTASSIUM CHLORIDE (KLOR-CON M) 10 MEQ EXTENDED RELEASE TABLET    Take 10 mEq by mouth daily    VITAMIN B-12 (CYANOCOBALAMIN) 100 MCG TABLET    Take 50 mcg by mouth daily         ALLERGIES     Patient has no known allergies. FAMILYHISTORY     History reviewed. No pertinent family history.        SOCIAL HISTORY       Social History     Socioeconomic History    Marital status:      Spouse name: None    Number of children: None    Years of education: None    Highest education level: None   Occupational History    None   Social Needs    Financial resource strain: None    Food insecurity     Worry: None     Inability: None    Transportation needs     Medical: None     Non-medical: None   Tobacco Use    Smoking status: Never Smoker    Smokeless tobacco: Never Used   Substance and Sexual Activity    Alcohol use: No    Drug use: Never    Sexual activity: Not Currently   Lifestyle    Physical activity     Days per week: None     Minutes per session: None    Stress: None   Relationships    Social connections     Talks on phone: None     Gets together: None     Attends Jain service: None     Active member of club or organization: None     Attends meetings of clubs or organizations: None     Relationship status: None    Intimate partner violence     Fear of current or ex partner: None     Emotionally abused: None     Physically abused: None     Forced sexual activity: None   Other Topics Concern    None   Social History Narrative    None       SCREENINGS    Qamar Coma Scale  Eye Opening: Spontaneous  Best Verbal Response: Oriented  Best Motor Response: Obeys commands  Arnold Coma Scale Score: 15        PHYSICAL EXAM    (up to 7 for level 4, 8 or more for level 5)     ED Triage Vitals   BP Temp Temp Source Pulse Resp SpO2 Height Weight   03/21/21 2112 03/21/21 2112 03/21/21 2112 03/21/21 2116 03/21/21 2112 03/21/21 2112 03/21/21 2112 03/21/21 2112   131/75 97.7 °F (36.5 °C) Oral 112 20 99 % 5' 9\" (1.753 m) 214 lb (97.1 kg)       Physical Exam  Vitals signs and nursing note reviewed. Constitutional:       General: He is not in acute distress. Appearance: He is well-developed. HENT:      Head: Normocephalic and atraumatic. Right Ear: External ear normal.      Left Ear: External ear normal.      Nose: Nose normal.      Mouth/Throat:      Pharynx: No oropharyngeal exudate.    Eyes:      General: No scleral icterus. Conjunctiva/sclera: Conjunctivae normal.   Neck:      Musculoskeletal: Normal range of motion and neck supple. Cardiovascular:      Rate and Rhythm: Normal rate and regular rhythm. Heart sounds: Normal heart sounds. No murmur. No friction rub. No gallop. Pulmonary:      Effort: Pulmonary effort is normal. No respiratory distress. Breath sounds: Normal breath sounds. No wheezing. Abdominal:      General: Abdomen is protuberant. Bowel sounds are normal. There is no distension. Palpations: Abdomen is soft. Tenderness: There is generalized abdominal tenderness. There is no guarding or rebound. Hernia: A hernia is present. Hernia is present in the umbilical area. Comments: Patient's abdomen is distended and tympanitic. Musculoskeletal: Normal range of motion. General: No tenderness. Lymphadenopathy:      Cervical: No cervical adenopathy. Skin:     General: Skin is warm and dry. Findings: No rash. Neurological:      Mental Status: He is alert and oriented to person, place, and time. GCS: GCS eye subscore is 4. GCS verbal subscore is 5. GCS motor subscore is 6. Cranial Nerves: No dysarthria or facial asymmetry. Motor: No weakness.          DIAGNOSTIC RESULTS   LABS:    Results for orders placed or performed during the hospital encounter of 03/21/21   COVID-19, Rapid    Specimen: Nasopharyngeal Swab   Result Value Ref Range    SARS-CoV-2, NAAT Not Detected Not Detected   CBC auto differential   Result Value Ref Range    WBC 11.3 (H) 4.0 - 11.0 K/uL    RBC 4.37 4.20 - 5.90 M/uL    Hemoglobin 12.4 (L) 13.5 - 17.5 g/dL    Hematocrit 38.3 (L) 40.5 - 52.5 %    MCV 87.6 80.0 - 100.0 fL    MCH 28.5 26.0 - 34.0 pg    MCHC 32.5 31.0 - 36.0 g/dL    RDW 15.5 (H) 12.4 - 15.4 %    Platelets 082 782 - 692 K/uL    MPV 7.2 5.0 - 10.5 fL    PLATELET SLIDE REVIEW Adequate     SLIDE REVIEW see below     Path Consult Yes     Neutrophils % 59.0 % Lymphocytes % 9.0 %    Monocytes % 17.0 %    Eosinophils % 0.0 %    Basophils % 0.0 %    Neutrophils Absolute 8.4 (H) 1.7 - 7.7 K/uL    Lymphocytes Absolute 1.0 1.0 - 5.1 K/uL    Monocytes Absolute 1.9 (H) 0.0 - 1.3 K/uL    Eosinophils Absolute 0.0 0.0 - 0.6 K/uL    Basophils Absolute 0.0 0.0 - 0.2 K/uL    Bands Relative 15 (H) 0 - 7 %    Anisocytosis Occasional (A)     Polychromasia Occasional (A)     Ovalocytes Occasional (A)    Comprehensive metabolic panel   Result Value Ref Range    Sodium 134 (L) 136 - 145 mmol/L    Potassium 4.3 3.5 - 5.1 mmol/L    Chloride 97 (L) 99 - 110 mmol/L    CO2 24 21 - 32 mmol/L    Anion Gap 13 3 - 16    Glucose 166 (H) 70 - 99 mg/dL    BUN 41 (H) 7 - 20 mg/dL    CREATININE 1.7 (H) 0.8 - 1.3 mg/dL    GFR Non- 38 (A) >60    GFR  46 (A) >60    Calcium 10.5 8.3 - 10.6 mg/dL    Total Protein 8.0 6.4 - 8.2 g/dL    Albumin 3.3 (L) 3.4 - 5.0 g/dL    Albumin/Globulin Ratio 0.7 (L) 1.1 - 2.2    Total Bilirubin 0.4 0.0 - 1.0 mg/dL    Alkaline Phosphatase 79 40 - 129 U/L    ALT 8 (L) 10 - 40 U/L    AST 12 (L) 15 - 37 U/L    Globulin 4.7 g/dL   Troponin   Result Value Ref Range    Troponin 0.02 (H) <0.01 ng/mL   Lipase   Result Value Ref Range    Lipase 11.0 (L) 13.0 - 60.0 U/L   Urine, reflex to culture    Specimen: Urine, clean catch   Result Value Ref Range    Color, UA Yellow Straw/Yellow    Clarity, UA Clear Clear    Glucose, Ur Negative Negative mg/dL    Bilirubin Urine Negative Negative    Ketones, Urine 15 (A) Negative mg/dL    Specific Gravity, UA 1.025 1.005 - 1.030    Blood, Urine TRACE-INTACT (A) Negative    pH, UA 5.0 5.0 - 8.0    Protein, UA TRACE (A) Negative mg/dL    Urobilinogen, Urine 0.2 <2.0 E.U./dL    Nitrite, Urine Negative Negative    Leukocyte Esterase, Urine Negative Negative    Microscopic Examination YES     Urine Type NotGiven     Urine Reflex to Culture Not Indicated    Lactate, Sepsis   Result Value Ref Range    Lactic Acid, Sepsis 4.4 (HH) 0.4 - 1.9 mmol/L   Lactate, Sepsis   Result Value Ref Range    Lactic Acid, Sepsis 3.5 (H) 0.4 - 1.9 mmol/L   Microscopic Urinalysis   Result Value Ref Range    Hyaline Casts, UA 3-5 (A) 0 - 2 /LPF    Mucus, UA 1+ (A) None Seen /LPF    WBC, UA 0-2 0 - 5 /HPF    RBC, UA 5-10 (A) 0 - 4 /HPF    Bacteria, UA Rare (A) None Seen /HPF    Amorphous, UA 2+ /HPF    Crystals, UA Few Ca.  Oxalate (A) None Seen /HPF   Comprehensive Metabolic Panel w/ Reflex to MG   Result Value Ref Range    Sodium 134 (L) 136 - 145 mmol/L    Potassium reflex Magnesium 4.5 3.5 - 5.1 mmol/L    Chloride 99 99 - 110 mmol/L    CO2 23 21 - 32 mmol/L    Anion Gap 12 3 - 16    Glucose 154 (H) 70 - 99 mg/dL    BUN 61 (H) 7 - 20 mg/dL    CREATININE 1.7 (H) 0.8 - 1.3 mg/dL    GFR Non- 38 (A) >60    GFR  46 (A) >60    Calcium 9.6 8.3 - 10.6 mg/dL    Total Protein 6.9 6.4 - 8.2 g/dL    Albumin 2.9 (L) 3.4 - 5.0 g/dL    Albumin/Globulin Ratio 0.7 (L) 1.1 - 2.2    Total Bilirubin 0.4 0.0 - 1.0 mg/dL    Alkaline Phosphatase 70 40 - 129 U/L    ALT 9 (L) 10 - 40 U/L    AST 13 (L) 15 - 37 U/L    Globulin 4.0 g/dL   CBC auto differential   Result Value Ref Range    WBC 9.4 4.0 - 11.0 K/uL    RBC 4.06 (L) 4.20 - 5.90 M/uL    Hemoglobin 11.5 (L) 13.5 - 17.5 g/dL    Hematocrit 35.3 (L) 40.5 - 52.5 %    MCV 87.1 80.0 - 100.0 fL    MCH 28.4 26.0 - 34.0 pg    MCHC 32.6 31.0 - 36.0 g/dL    RDW 15.1 12.4 - 15.4 %    Platelets 261 126 - 935 K/uL    MPV 7.4 5.0 - 10.5 fL    PLATELET SLIDE REVIEW Adequate     SLIDE REVIEW see below     Path Consult No     Neutrophils % 50.0 %    Lymphocytes % 25.0 %    Monocytes % 2.0 %    Eosinophils % 0.0 %    Basophils % 0.0 %    Neutrophils Absolute 6.9 1.7 - 7.7 K/uL    Lymphocytes Absolute 2.4 1.0 - 5.1 K/uL    Monocytes Absolute 0.2 0.0 - 1.3 K/uL    Eosinophils Absolute 0.0 0.0 - 0.6 K/uL    Basophils Absolute 0.0 0.0 - 0.2 K/uL    Bands Relative 23 (H) 0 - 7 %   Troponin   Result Value Ref Range    Troponin 0.02 (H) <0.01 ng/mL   EKG 12 Lead   Result Value Ref Range    Ventricular Rate 114 BPM    Atrial Rate 114 BPM    P-R Interval 148 ms    QRS Duration 98 ms    Q-T Interval 326 ms    QTc Calculation (Bazett) 449 ms    P Axis 55 degrees    R Axis -62 degrees    T Axis 87 degrees    Diagnosis       Sinus tachycardia with occasional Premature ventricular complexesleft axisLeft anterior fascicular blockAbnormal ECGWhen compared with ECG of 10-FEB-2021 08:29,No significant change was foundConfirmed by SHELBY MEYERS MD ([id number]) on 3/22/2021 7:25:06 AM       All other labs were within normal range ornot returned as of this dictation. EKG: All EKG's are interpreted by the Emergency Department Physician who either signs or Co-signs this chart in the absence of a cardiologist.  Please see their note for interpretation of EKG. EKG Interpretation    Interpreted by emergency department physician    Rhythm: sinus tachycardia  Rate: tachycardia  Axis: left  Ectopy: premature ventricular contractions (infrequent)  Conduction: left anterior fasciclar block  ST Segments: normal  T Waves: normal  Q Waves: none    Clinical Impression: sinus tachycardia, left axis deviation, left anterior fascicular block, occasional PVC. RADIOLOGY:   Non-plain film images such as CT, Ultrasound and MRI are read by the radiologist.  Plain radiographic images are visualized and preliminarily interpreted by the ED Provider with the belowfindings:    Interpretation per the Radiologist below, if available at the time of this note:    XR CHEST PORTABLE   Final Result   1. New patchy opacity at the right lung base could represent atelectasis,   pneumonia or aspiration in the correct clinical setting. 2. Opacity at the left lung base has decreased and likely represents   resolving atelectasis with a component of scar.          CT HEAD WO CONTRAST   Final Result      No evidence for acute intracranial hemorrhage, territorial infarction or   intracranial mass lesion. Moderate chronic microangiopathic ischemic disease. Moderate generalized volume loss. CT ABDOMEN PELVIS WO CONTRAST Additional Contrast? None   Final Result   Dilated loops of small and large bowel. This may be due to nonspecific   ileus. However there is a segment sigmoid colon with wall thickening and   underdistention. This could be due to chronic inflammatory process or   chronic diverticulitis. There is associated small contained abscess near   this bowel loop. Additionally there may be questionable system fistulous   tract extending from the visualize cavity similar other additional bowel   loops. Contained perforated diverticulitis may also be considered in   differential.      Small loculated collection near the dome of the bladder may represent and gas   within the lumen of bladder versus gas within the bladder wall with   questionable fistulous tract extending to a bowel loop. Findings worrisome for pneumatosis involving some of the dilated loops of   bowel. Cholelithiasis with dilated gallbladder. Enteric contrast oral or rectal route could be beneficial to evaluate these   bowel loops and and questionable fistulous tracks. XR CHEST PORTABLE   Final Result   Mild left basilar atelectasis or scarring. Otherwise, no acute abnormality   identified. Moderate cardiomegaly not appreciably changed. PROCEDURES   Unless otherwise noted below, none     Procedures    CRITICAL CARE TIME   Total critical care time today provided was 43 minutes. This excludes seperately billable procedures and family discussion time. Provided for acute severe sepsis, acute kidney injury, acute perforated diverticulitis requiring intervention with concern for potential decompensation.       CONSULTS:  Jay Blackmon CONSULT TO HOSPITALIST  FARHEEN CONSULT TO PHARMACY      EMERGENCY DEPARTMENT COURSE and DIFFERENTIAL DIAGNOSIS/MDM:   Vitals:    Vitals:    03/22/21 0421 03/22/21 0437 03/22/21 0810 03/22/21 1006   BP: 108/69 124/64 135/70 (!) 147/80   Pulse: 110 116 117 96   Resp: 9 20 (!) 44    Temp:       TempSrc:       SpO2: 94% 96% 93% 95%   Weight:       Height:           Patient was given the following medications:  Medications   morphine (PF) injection 2 mg (2 mg Intravenous Given 3/22/21 0832)   fluticasone (FLONASE) 50 MCG/ACT nasal spray 1 spray (1 spray Each Nostril Given 3/22/21 0838)   sodium chloride flush 0.9 % injection 10 mL (10 mLs Intravenous Not Given 3/22/21 0826)   sodium chloride flush 0.9 % injection 10 mL (has no administration in time range)   enoxaparin (LOVENOX) injection 100 mg (100 mg Subcutaneous Given 3/22/21 0835)   acetaminophen (TYLENOL) tablet 650 mg (has no administration in time range)     Or   acetaminophen (TYLENOL) suppository 650 mg (has no administration in time range)   piperacillin-tazobactam (ZOSYN) 3,375 mg in sodium chloride 0.9 % 100 mL IVPB extended infusion (mini-bag) (3,375 mg Intravenous New Bag 3/22/21 0825)   0.9 % sodium chloride infusion ( Intravenous New Bag 3/22/21 0506)   prochlorperazine (COMPAZINE) injection 10 mg (has no administration in time range)   brimonidine (ALPHAGAN) 0.2 % ophthalmic solution 1 drop (1 drop Ophthalmic Given 3/22/21 0838)     And   timolol (TIMOPTIC) 0.5 % ophthalmic solution 1 drop (1 drop Ophthalmic Given 3/22/21 0838)   vancomycin (VANCOCIN) intermittent dosing (placeholder) (has no administration in time range)   albuterol (PROVENTIL) nebulizer solution 2.5 mg (2.5 mg Nebulization Given 3/22/21 0835)   ondansetron (ZOFRAN) injection 4 mg (4 mg Intravenous Given 3/21/21 2144)   lactated ringers bolus (0 mL/kg × 97.1 kg Intravenous Stopped 3/22/21 0100)   piperacillin-tazobactam (ZOSYN) 4,500 mg in sodium chloride 0.9 % 100 mL IVPB (mini-bag) (0 g Intravenous Stopped 3/21/21 6585)   vancomycin (VANCOCIN) 2,000 mg in dextrose 5 % 500 mL IVPB (0 mg Intravenous Stopped 3/22/21 0232)   fentaNYL (SUBLIMAZE) injection 50 mcg (50 mcg Intravenous Given 3/22/21 0332)       ED Course as of Mar 22 1108   Sun Mar 21, 2021   2242 Sepsis identified. Sepsis bolus and broad-spectrum antibiotics initiated. [TC]   Mon Mar 22, 2021   0014 Case discussed with Dr. Avila Gardner, on-call for general surgery. He just finished surgery at [facility] and is going to drive over here. He reviewed the images and is going to have further discussion with the family regarding the additional treatment options. Patient and his wife are updated and are agreeable with waiting. [TC]      ED Course User Olga Herrera MD     Patient is a 80year old recently treated for uncomplicated diverticulitis, who has increasing abdominal distention and pain for the last several days. Patient's exam is concerning, especially his distention. He arrived tachycardic. Patient was started on the sepsis bundle. Broad spectrum antibiotics initiated. Patient appears to have acute kidney injury, likely secondary to decreased oral intake. Patient's CT scan is markedly abnormal.  Case was discussed with Dr. Avila Gardner, on call for general surgery. He reviewed the patients' imaging. He is coming in to the ED to have a discussion with the patient and his wife regarding further care. He may require a major operation, or be placed on comfort measures pending his discussion with the family. Patient and wife updated on his serious conditions and surgical consultation. Patient's tachycardia did improve while receiving fluids. Differential diagnosis included but was not limited to: kidney stone, pyelonephritis, UTI, appendicitis, bowel obstruction, diverticulitis, hernia, gastritis/gastroenteritis, pancreatitis, cholecystitis, hepatitis, constipation, IBS, IBD, sepsis, perforated viscus.     SEP-1 CORE MEASURE DATA    Classification: severe sepsis    Amount of fluids ordered: at least 30mL/kg based on entered actual weight at time of triage    Time at which sepsis was identified: 2242    Broad-spectrum antibiotics chosen: Zosyn/Vanc based on suspected source of: Unknown    Repeat lactate level: improving    On reassessment after fluid resuscitation:   I have reassessed tissue perfusion and hemodynamic status after fluid bolus; tachycardia improving, lactic acid trending downward. Exclusion criteria: the patient is NOT to be included for sepsis due to:  N/A    qSOFA (quick Sepsis-related Organ Failure Assessment) Score    Respiratory rate >= 22/minute:  +1 for yes  Altered mentation:  +0 for no  Systolic blood pressure <=598AKMN: +0 for no    Score: 1. This falls under the following category: Score of 0-1, which indicates a lower risk of bad outcome. FINAL IMPRESSION      1. Severe sepsis ([facility] [address]    2. Diverticulitis of large intestine with abscess, unspecified bleeding status    3. Intestinal obstruction, unspecified cause, unspecified whether partial or complete ([facility] [address]    4.  KERMIT (acute kidney injury) [facility])          [address] Admitted 03/22/2021 04:02:02 AM    (Please note that portions of this note were completed with a voice recognition program.  Efforts were made to edit the dictations but occasionally words are mis-transcribed.)    Romaine Keller MD(electronically signed)              Romaine Keller MD  03/22/21 2725

## 2021-03-22 NOTE — CONSULTS
spray 1 spray by Each Nostril route daily   Yes Historical Provider, MD   HYDROcodone-acetaminophen (NORCO) 5-325 MG per tablet Take 1 tablet by mouth every 6 hours as needed for Pain. Yes Historical Provider, MD   Multiple Vitamins-Minerals (THERAPEUTIC MULTIVITAMIN-MINERALS) tablet Take 1 tablet by mouth daily   Yes Historical Provider, MD   potassium chloride (KLOR-CON M) 10 MEQ extended release tablet Take 10 mEq by mouth daily   Yes Historical Provider, MD   vitamin B-12 (CYANOCOBALAMIN) 100 MCG tablet Take 50 mcg by mouth daily   Yes Historical Provider, MD   apixaban (ELIQUIS) 5 MG TABS tablet Take 5 mg by mouth 2 times daily   Yes Historical Provider, MD   ferrous sulfate (IRON 325) 325 (65 Fe) MG tablet Take 325 mg by mouth daily (with breakfast)   Yes Historical Provider, MD   Brimonidine Tartrate-Timolol (COMBIGAN OP) Apply 1 drop to eye 2 times daily    Yes Historical Provider, MD        Allergies:  Patient has no known allergies. Social History:   TOBACCO:   reports that he has never smoked. He has never used smokeless tobacco.  ETOH:   reports no history of alcohol use. DRUGS:   reports no history of drug use. Family History:    History reviewed. No pertinent family history. REVIEW OF SYSTEMS:  CONSTITUTIONAL:  positive for  fatigue, malaise and anorexia  HEENT:  negative  RESPIRATORY:  negative  CARDIOVASCULAR:  negative  GASTROINTESTINAL:  negative except for nausea, diarrhea, abdominal pain and abdominal distention  GENITOURINARY:  negative  HEMATOLOGIC/LYMPHATIC:  negative  NEUROLOGICAL:  Negative  * All other ROS reviewed and negative. PHYSICAL EXAM:  VITALS:  /88   Pulse 110   Temp 97.7 °F (36.5 °C) (Oral)   Resp 15   Ht 5' 9\" (1.753 m)   Wt 214 lb (97.1 kg)   SpO2 95%   BMI 31.60 kg/m²   24HR INTAKE/OUTPUT:    No intake/output data recorded. No intake/output data recorded.     CONSTITUTIONAL:  alert, no apparent distress and mildly obese  EYES:  PERRL, sclera clear  ENT:  Normocephalic,atraumatic, without obvious abnormality  NECK:  supple, symmetrical, trachea midline  LUNGS: Resp effort easy and unlabored  CARDIOVASCULAR:  NO JVD, irregularly irregular rhythm and no murmur noted  ABDOMEN:  no scars, hyperactive bowel sounds, soft, distended, tenderness noted in the suprapubic area and in the right midabdomen, voluntary guarding absent, no masses palpated and hernia absent  MUSCULOSKELETAL: No clubbing or cyanosis, 1+ pitting edema lower extremities  NEUROLOGIC:  Mental Status Exam:  Level of Alertness:   awake  PSYCHIATRIC:   person, place, time  SKIN:  no bruising or bleeding, normal skin color, texture, turgor and no redness, warmth, or swelling    DATA:    CBC:   Recent Labs     03/21/21 2126   WBC 11.3*   HGB 12.4*   HCT 38.3*        BMP:    Recent Labs     03/21/21 2126   *   K 4.3   CL 97*   CO2 24   BUN 41*   CREATININE 1.7*   GLUCOSE 166*     Hepatic:   Recent Labs     03/21/21 2126   AST 12*   ALT 8*   BILITOT 0.4   ALKPHOS 79     Mag:    No results for input(s): MG in the last 72 hours. Phos:   No results for input(s): PHOS in the last 72 hours. INR: No results for input(s): INR in the last 72 hours. Radiology Review: Images personally reviewed by me.    Dilated loops of small and large bowel.  This may be due to nonspecific   ileus.  However there is a segment sigmoid colon with wall thickening and   underdistention.  This could be due to chronic inflammatory process or   chronic diverticulitis. Case [place] is associated small contained abscess near   this bowel loop.  Additionally there may be questionable system fistulous   tract extending from the visualize cavity similar other additional bowel   loops.  Contained perforated diverticulitis may also be considered in   differential.     Small loculated collection near the dome of the bladder may represent and gas   within the lumen of bladder versus gas within the bladder wall with questionable fistulous tract extending to a bowel loop. Findings worrisome for pneumatosis involving some of the dilated loops of   bowel.  Cholelithiasis with dilated gallbladder. 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 While there may be a small contained abscess adjacent to the sigmoid colon, at all think this is a critical factor. If we were to proceed with surgery, I would recommend exploratory laparotomy and, based on intra-abdominal findings, I suspect he would likely need a total colectomy with end ileostomy. I explained the procedure including risks, benefits, and alternatives. Given his age and general underlying debility, I also did offer nonoperative management, however explained that this would likely result in his demise. Both he and his wife had numerous appropriate questions, which I answered. After extensive discussion, he has decided against surgery. He will be admitted to the hospital for comfort measures.   Please call if needed    Total time spent was 80 minutes, greater than 50% of which involved counseling      Electronically signed by Carmel Richards MD     [facility]

## 2021-03-26 LAB
BLOOD CULTURE, ROUTINE: NORMAL
CULTURE, BLOOD 2: NORMAL